# Patient Record
Sex: MALE | Race: WHITE | NOT HISPANIC OR LATINO | Employment: FULL TIME | URBAN - METROPOLITAN AREA
[De-identification: names, ages, dates, MRNs, and addresses within clinical notes are randomized per-mention and may not be internally consistent; named-entity substitution may affect disease eponyms.]

---

## 2017-02-11 ENCOUNTER — OFFICE VISIT (OUTPATIENT)
Dept: URGENT CARE | Facility: URGENT CARE | Age: 62
End: 2017-02-11
Payer: COMMERCIAL

## 2017-02-11 VITALS
DIASTOLIC BLOOD PRESSURE: 81 MMHG | HEIGHT: 72 IN | BODY MASS INDEX: 27.22 KG/M2 | HEART RATE: 73 BPM | SYSTOLIC BLOOD PRESSURE: 122 MMHG | WEIGHT: 201 LBS | TEMPERATURE: 98.1 F | OXYGEN SATURATION: 96 %

## 2017-02-11 DIAGNOSIS — J06.9 UPPER RESPIRATORY TRACT INFECTION, UNSPECIFIED TYPE: Primary | ICD-10-CM

## 2017-02-11 PROCEDURE — 99202 OFFICE O/P NEW SF 15 MIN: CPT | Performed by: NURSE PRACTITIONER

## 2017-02-11 NOTE — PATIENT INSTRUCTIONS

## 2017-02-11 NOTE — MR AVS SNAPSHOT
After Visit Summary   2/11/2017    Juan Carlos Helton    MRN: [id number]           Patient Information     Date Of Birth          1955        Visit Information        Provider Department      2/11/2017 11:00 AM Verónica Kaminski NP [facility]        Care Instructions      Viral Upper Respiratory Illness (Adult)  You have a viral upper respiratory illness (URI), which is another term for the common cold. This illness is contagious during the first few days. It is spread through the air by coughing and sneezing. It may also be spread by direct contact (touching the sick person and then touching your own eyes, nose, or mouth). Frequent handwashing will decrease risk of spread. Most viral illnesses go away within 7 to 10 days with rest and simple home remedies. Sometimes the illness may last for several weeks. Antibiotics will not kill a virus, and they are generally not prescribed for this condition.    Home care    If symptoms are severe, rest at home for the first 2 to 3 days. When you resume activity, don't let yourself get too tired.    Avoid being exposed to cigarette smoke (yours or others ).    You may use acetaminophen or ibuprofen to control pain and fever, unless another medicine was prescribed. (Note: If you have chronic liver or kidney disease, have ever had a stomach ulcer or gastrointestinal bleeding, or are taking blood-thinning medicines, talk with your healthcare provider before using these medicines.) Aspirin should never be given to anyone under 18 years of age who is ill with a viral infection or fever. It may cause severe liver or brain damage.    Your appetite may be poor, so a light diet is fine. Avoid dehydration by drinking 6 to 8 glasses of fluids per day (water, soft drinks, juices, tea, or soup). Extra fluids will help loosen secretions in the nose and lungs.    Over-the-counter cold medicines will not shorten the length of time you re sick, but they may  be helpful for the following symptoms: cough, sore throat, and nasal and sinus congestion. (Note: Do not use decongestants if you have high blood pressure.)  Follow-up care  Follow up with your healthcare provider, or as advised.  When to seek medical advice  Call your healthcare provider right away if any of these occur:    Cough with lots of colored sputum (mucus)    Severe headache; face, neck, or ear pain    Difficulty swallowing due to throat pain    Fever of 100.4 F (38 C)  Call 911, or get immediate medical care  Call emergency services right away if any of these occur:    Chest pain, shortness of breath, wheezing, or difficulty breathing    Coughing up blood    Inability to swallow due to throat pain    6750-1572 [employer]. [address] [place] [address], [place] [address]. All rights reserved. This information is not intended as a substitute for professional medical care. Always follow your healthcare professional's instructions.              Follow-ups after your visit        Who to contact     If you have questions or need follow up information about today's clinic visit or your schedule please contact [facility] directly at [phone].  Normal or non-critical lab and imaging results will be communicated to you by BladeLog[employer]art, letter or phone within 4 business days after the clinic has received the results. If you do not hear from us within 7 days, please contact the clinic through [employer] or phone. If you have a critical or abnormal lab result, we will notify you by phone as soon as possible.  Submit refill requests through Idhas[employer] or call your pharmacy and they will forward the refill request to us. Please allow 3 business days for your refill to be completed.          Additional Information About Your Visit        [employer] Information Technology Information     Id[employer] lets you send messages to your doctor, view your test results, renew your prescriptions, schedule appointments and more. To sign  "up, go to [email or web address] . Click on \"Log in\" on the left side of the screen, which will take you to the Welcome page. Then click on \"Sign up Now\" on the right side of the page.     You will be asked to enter the access code listed below, as well as some personal information. Please follow the directions to create your username and password.     Your access code is: [email or web address]  Expires: 2017 11:52 AM     Your access code will  in 90 days. If you need help or a new code, please call your [facility] clinic or [phone].        Care EveryWhere ID     This is your Care EveryWhere ID. This could be used by other organizations to access your [facility] medical records  [id number]        Your Vitals Were     Pulse Temperature Height BMI (Body Mass Index) Pulse Oximetry       73 98.1  F (36.7  C) (Tympanic) 6' (1.829 m) 27.25 kg/m2 96%        Blood Pressure from Last 3 Encounters:   17 122/81   13 108/80   12 108/82    Weight from Last 3 Encounters:   17 201 lb (91.173 kg)   13 200 lb (90.719 kg)   12 198 lb (89.812 kg)              Today, you had the following     No orders found for display       Primary Care Provider Office Phone # Fax #    Jose Trevizo [phone]       [facility] [id number] [place] [address]        Thank you!     Thank you for choosing [facility]  for your care. Our goal is always to provide you with excellent care. Hearing back from our patients is one way we can continue to improve our services. Please take a few minutes to complete the written survey that you may receive in the mail after your visit with us. Thank you!             Your Updated Medication List - Protect others around you: Learn how to safely use, store and throw away your medicines at www.disposemymeds.org.          This list is accurate as of: 17 11:53 AM.  Always use your most recent med list.                   " Brand Name Dispense Instructions for use    ADVIL PO      Take  by mouth as needed.       CELEBREX PO      Take  by mouth.       FISH OIL + D3 PO          LORATADINE PO      Take  by mouth.       MULTIVITAMIN ADULT PO          OMEPRAZOLE PO      Take  by mouth.       SIMVASTATIN PO      Take  by mouth.       VITAMIN C PO      Take  by mouth.

## 2017-02-11 NOTE — NURSING NOTE
Chief Complaint   Patient presents with     Urgent Care     Pt in clinic to have eval for sinus pressure, cough, and congestion for 3 days.     Sinus Problem     Respiratory Problems       Initial /81 mmHg  Pulse 73  Temp(Src) 98.1  F (36.7  C) (Tympanic)  Ht 6' (1.829 m)  Wt 201 lb (91.173 kg)  BMI 27.25 kg/m2  SpO2 96% Estimated body mass index is 27.25 kg/(m^2) as calculated from the following:    Height as of this encounter: 6' (1.829 m).    Weight as of this encounter: 201 lb (91.173 kg).  Medication Reconciliation: complete   Michaela Shrestha/ MA

## 2017-02-11 NOTE — PROGRESS NOTES
SUBJECTIVE:   Juan Carlos Helton is a 61 year old male presenting with a chief complaint of nasal congestion, rhinorrhea a lot and cough with some chills but no fever for the last 3 days   Patient complains of headache 5/10  Prior to this sick 3 weeks ago for 24 hours .  Course of illness is staying the same   Severity moderate severe  Current and Associated symptoms: nasal congestion, rhinorrhea and cough   Treatment measures tried include Day and Night, Sudafed and Advil  Predisposing factors include None    No past medical history on file.  Current Outpatient Prescriptions   Medication Sig Dispense Refill     Multiple Vitamins-Minerals (MULTIVITAMIN ADULT PO)        Ibuprofen (ADVIL PO) Take  by mouth as needed.       OMEPRAZOLE PO Take  by mouth.       Ascorbic Acid (VITAMIN C PO) Take  by mouth.       Fish Oil-Cholecalciferol (FISH OIL + D3 PO)        LORATADINE PO Take  by mouth.       Celecoxib (CELEBREX PO) Take  by mouth.       SIMVASTATIN PO Take  by mouth.       Social History   Substance Use Topics     Smoking status: Never Smoker      Smokeless tobacco: Never Used     Alcohol Use: Not on file     ROS:  INTEGUMENTARY/SKIN: NEGATIVE fo rashes,  GI: NEGATIVE for nausea, vomiting or diarrhea    OBJECTIVE  :/81 mmHg  Pulse 73  Temp(Src) 98.1  F (36.7  C) (Tympanic)  Ht 6' (1.829 m)  Wt 201 lb (91.173 kg)  BMI 27.25 kg/m2  SpO2 96%  GENERAL APPEARANCE: healthy, alert and no distress  EYES: EOMI,  PERRL, conjunctiva clear  HENT: ear canals and TM's normal.  Nares bilateral erythema without lesions  Oral pharynx erythema  NECK: supple, nontender, no lymphadenopathy  RESP: lungs clear to auscultation - no rales, rhonchi or wheezes  CV: regular rates and rhythm, normal S1 S2, no murmur noted  NEURO:   normal speech and mentation  SKIN: no suspicious lesions or rashes    ASSESSMENT: URI     PLAN:  Your symptoms appear to be viral at this time.  Nasal congestion often starts clear then turns yellow or green  towards the end- this is not a sign of a bacterial infection.  May use netti pot with bottled or distilled water and saline packets to flush sinuses.  Mucinex (guiafenesin) thins mucus and may help it to loosen more quickly  Sit in the bathroom with the door closed and hot shower running to loosen mucus.  See orders in Epic

## 2017-06-14 ENCOUNTER — RECORDS - HEALTHEAST (OUTPATIENT)
Dept: LAB | Facility: CLINIC | Age: 62
End: 2017-06-14

## 2017-06-14 LAB
CHOLEST SERPL-MCNC: 205 MG/DL
FASTING STATUS PATIENT QL REPORTED: NO
HDLC SERPL-MCNC: 42 MG/DL
LDLC SERPL CALC-MCNC: 130 MG/DL
PSA SERPL-MCNC: 0.6 NG/ML (ref 0–4.5)
TRIGL SERPL-MCNC: 164 MG/DL

## 2018-03-20 ENCOUNTER — RECORDS - HEALTHEAST (OUTPATIENT)
Dept: ADMINISTRATIVE | Facility: OTHER | Age: 63
End: 2018-03-20

## 2018-06-14 ENCOUNTER — RECORDS - HEALTHEAST (OUTPATIENT)
Dept: LAB | Facility: CLINIC | Age: 63
End: 2018-06-14

## 2018-06-14 ENCOUNTER — RECORDS - HEALTHEAST (OUTPATIENT)
Dept: ADMINISTRATIVE | Facility: OTHER | Age: 63
End: 2018-06-14

## 2018-06-14 LAB
ANION GAP SERPL CALCULATED.3IONS-SCNC: 11 MMOL/L (ref 5–18)
BUN SERPL-MCNC: 21 MG/DL (ref 8–22)
CALCIUM SERPL-MCNC: 9.9 MG/DL (ref 8.5–10.5)
CHLORIDE BLD-SCNC: 102 MMOL/L (ref 98–107)
CHOLEST SERPL-MCNC: 211 MG/DL
CO2 SERPL-SCNC: 23 MMOL/L (ref 22–31)
CREAT SERPL-MCNC: 0.83 MG/DL (ref 0.7–1.3)
FASTING STATUS PATIENT QL REPORTED: NO
GFR SERPL CREATININE-BSD FRML MDRD: >60 ML/MIN/1.73M2
GLUCOSE BLD-MCNC: 79 MG/DL (ref 70–125)
HDLC SERPL-MCNC: 43 MG/DL
LDLC SERPL CALC-MCNC: 126 MG/DL
POTASSIUM BLD-SCNC: 4.5 MMOL/L (ref 3.5–5)
SODIUM SERPL-SCNC: 136 MMOL/L (ref 136–145)
TRIGL SERPL-MCNC: 209 MG/DL

## 2019-06-17 ENCOUNTER — OFFICE VISIT - HEALTHEAST (OUTPATIENT)
Dept: FAMILY MEDICINE | Facility: CLINIC | Age: 64
End: 2019-06-17

## 2019-06-17 DIAGNOSIS — Z13.228 SCREENING FOR ENDOCRINE, NUTRITIONAL, METABOLIC AND IMMUNITY DISORDER: ICD-10-CM

## 2019-06-17 DIAGNOSIS — Z13.0 SCREENING FOR ENDOCRINE, NUTRITIONAL, METABOLIC AND IMMUNITY DISORDER: ICD-10-CM

## 2019-06-17 DIAGNOSIS — Z13.29 SCREENING FOR ENDOCRINE, NUTRITIONAL, METABOLIC AND IMMUNITY DISORDER: ICD-10-CM

## 2019-06-17 DIAGNOSIS — K21.9 GASTROESOPHAGEAL REFLUX DISEASE WITHOUT ESOPHAGITIS: ICD-10-CM

## 2019-06-17 DIAGNOSIS — Z13.21 SCREENING FOR ENDOCRINE, NUTRITIONAL, METABOLIC AND IMMUNITY DISORDER: ICD-10-CM

## 2019-06-17 DIAGNOSIS — Z00.00 PREVENTATIVE HEALTH CARE: ICD-10-CM

## 2019-06-17 LAB
ALBUMIN SERPL-MCNC: 4 G/DL (ref 3.5–5)
ALP SERPL-CCNC: 55 U/L (ref 45–120)
ALT SERPL W P-5'-P-CCNC: 21 U/L (ref 0–45)
ANION GAP SERPL CALCULATED.3IONS-SCNC: 9 MMOL/L (ref 5–18)
AST SERPL W P-5'-P-CCNC: 28 U/L (ref 0–40)
BASOPHILS # BLD AUTO: 0 THOU/UL (ref 0–0.2)
BASOPHILS NFR BLD AUTO: 1 % (ref 0–2)
BILIRUB SERPL-MCNC: 0.7 MG/DL (ref 0–1)
BUN SERPL-MCNC: 12 MG/DL (ref 8–22)
CALCIUM SERPL-MCNC: 9.9 MG/DL (ref 8.5–10.5)
CHLORIDE BLD-SCNC: 104 MMOL/L (ref 98–107)
CHOLEST SERPL-MCNC: 234 MG/DL
CO2 SERPL-SCNC: 25 MMOL/L (ref 22–31)
CREAT SERPL-MCNC: 0.81 MG/DL (ref 0.7–1.3)
EOSINOPHIL # BLD AUTO: 0.1 THOU/UL (ref 0–0.4)
EOSINOPHIL NFR BLD AUTO: 2 % (ref 0–6)
ERYTHROCYTE [DISTWIDTH] IN BLOOD BY AUTOMATED COUNT: 12.1 % (ref 11–14.5)
FASTING STATUS PATIENT QL REPORTED: YES
GFR SERPL CREATININE-BSD FRML MDRD: >60 ML/MIN/1.73M2
GLUCOSE BLD-MCNC: 91 MG/DL (ref 70–125)
HBA1C MFR BLD: 5.4 % (ref 3.5–6)
HCT VFR BLD AUTO: 44 % (ref 40–54)
HDLC SERPL-MCNC: 49 MG/DL
HGB BLD-MCNC: 15.2 G/DL (ref 14–18)
LDLC SERPL CALC-MCNC: 148 MG/DL
LYMPHOCYTES # BLD AUTO: 1.1 THOU/UL (ref 0.8–4.4)
LYMPHOCYTES NFR BLD AUTO: 21 % (ref 20–40)
MCH RBC QN AUTO: 31.8 PG (ref 27–34)
MCHC RBC AUTO-ENTMCNC: 34.5 G/DL (ref 32–36)
MCV RBC AUTO: 92 FL (ref 80–100)
MONOCYTES # BLD AUTO: 0.4 THOU/UL (ref 0–0.9)
MONOCYTES NFR BLD AUTO: 8 % (ref 2–10)
NEUTROPHILS # BLD AUTO: 3.6 THOU/UL (ref 2–7.7)
NEUTROPHILS NFR BLD AUTO: 68 % (ref 50–70)
PLATELET # BLD AUTO: 207 THOU/UL (ref 140–440)
PMV BLD AUTO: 7.4 FL (ref 7–10)
POTASSIUM BLD-SCNC: 4.4 MMOL/L (ref 3.5–5)
PROT SERPL-MCNC: 7.3 G/DL (ref 6–8)
PSA SERPL-MCNC: 1.1 NG/ML (ref 0–4.5)
RBC # BLD AUTO: 4.77 MILL/UL (ref 4.4–6.2)
SODIUM SERPL-SCNC: 138 MMOL/L (ref 136–145)
TRIGL SERPL-MCNC: 187 MG/DL
WBC: 5.3 THOU/UL (ref 4–11)

## 2019-06-17 ASSESSMENT — MIFFLIN-ST. JEOR: SCORE: 1724.99

## 2019-07-19 ENCOUNTER — COMMUNICATION - HEALTHEAST (OUTPATIENT)
Dept: FAMILY MEDICINE | Facility: CLINIC | Age: 64
End: 2019-07-19

## 2019-07-19 DIAGNOSIS — K21.9 GASTROESOPHAGEAL REFLUX DISEASE WITHOUT ESOPHAGITIS: ICD-10-CM

## 2019-08-26 ENCOUNTER — COMMUNICATION - HEALTHEAST (OUTPATIENT)
Dept: FAMILY MEDICINE | Facility: CLINIC | Age: 64
End: 2019-08-26

## 2019-08-26 DIAGNOSIS — K21.9 GASTROESOPHAGEAL REFLUX DISEASE WITHOUT ESOPHAGITIS: ICD-10-CM

## 2019-10-07 ENCOUNTER — OFFICE VISIT - HEALTHEAST (OUTPATIENT)
Dept: FAMILY MEDICINE | Facility: CLINIC | Age: 64
End: 2019-10-07

## 2019-10-07 DIAGNOSIS — B07.0 PLANTAR WARTS: ICD-10-CM

## 2019-12-09 ENCOUNTER — OFFICE VISIT - HEALTHEAST (OUTPATIENT)
Dept: FAMILY MEDICINE | Facility: CLINIC | Age: 64
End: 2019-12-09

## 2019-12-09 DIAGNOSIS — B07.0 PLANTAR WART OF LEFT FOOT: ICD-10-CM

## 2020-02-05 ENCOUNTER — OFFICE VISIT - HEALTHEAST (OUTPATIENT)
Dept: PODIATRY | Facility: CLINIC | Age: 65
End: 2020-02-05

## 2020-02-05 DIAGNOSIS — Q66.72 CAVUS DEFORMITY OF LEFT FOOT: ICD-10-CM

## 2020-02-05 DIAGNOSIS — M21.6X2 PLANTAR FLEXED METATARSAL BONE OF LEFT FOOT: ICD-10-CM

## 2020-02-05 DIAGNOSIS — B07.0 VERRUCA PLANTARIS: ICD-10-CM

## 2020-02-10 ENCOUNTER — RECORDS - HEALTHEAST (OUTPATIENT)
Dept: ADMINISTRATIVE | Facility: OTHER | Age: 65
End: 2020-02-10

## 2020-02-10 ENCOUNTER — OFFICE VISIT - HEALTHEAST (OUTPATIENT)
Dept: INTERNAL MEDICINE | Facility: CLINIC | Age: 65
End: 2020-02-10

## 2020-02-10 DIAGNOSIS — R20.2 NUMBNESS AND TINGLING IN LEFT HAND: ICD-10-CM

## 2020-02-10 DIAGNOSIS — M54.2 NECK PAIN: ICD-10-CM

## 2020-02-10 DIAGNOSIS — M67.432 GANGLION CYST OF WRIST, LEFT: ICD-10-CM

## 2020-02-10 DIAGNOSIS — G56.02 CARPAL TUNNEL SYNDROME OF LEFT WRIST: ICD-10-CM

## 2020-02-10 DIAGNOSIS — R20.0 NUMBNESS AND TINGLING IN LEFT HAND: ICD-10-CM

## 2020-02-20 ENCOUNTER — COMMUNICATION - HEALTHEAST (OUTPATIENT)
Dept: OTHER | Facility: CLINIC | Age: 65
End: 2020-02-20

## 2020-02-26 ENCOUNTER — RECORDS - HEALTHEAST (OUTPATIENT)
Dept: ADMINISTRATIVE | Facility: OTHER | Age: 65
End: 2020-02-26

## 2020-03-04 ENCOUNTER — AMBULATORY - HEALTHEAST (OUTPATIENT)
Dept: OTHER | Facility: CLINIC | Age: 65
End: 2020-03-04

## 2020-03-19 ENCOUNTER — AMBULATORY - HEALTHEAST (OUTPATIENT)
Dept: OTHER | Facility: CLINIC | Age: 65
End: 2020-03-19

## 2020-07-23 ENCOUNTER — COMMUNICATION - HEALTHEAST (OUTPATIENT)
Dept: FAMILY MEDICINE | Facility: CLINIC | Age: 65
End: 2020-07-23

## 2020-07-23 DIAGNOSIS — K21.9 GASTROESOPHAGEAL REFLUX DISEASE WITHOUT ESOPHAGITIS: ICD-10-CM

## 2020-07-28 ENCOUNTER — OFFICE VISIT - HEALTHEAST (OUTPATIENT)
Dept: PODIATRY | Facility: CLINIC | Age: 65
End: 2020-07-28

## 2020-07-28 DIAGNOSIS — M21.6X2 PLANTAR FLEXED METATARSAL BONE OF LEFT FOOT: ICD-10-CM

## 2020-08-28 ENCOUNTER — COMMUNICATION - HEALTHEAST (OUTPATIENT)
Dept: FAMILY MEDICINE | Facility: CLINIC | Age: 65
End: 2020-08-28

## 2020-09-08 ENCOUNTER — OFFICE VISIT - HEALTHEAST (OUTPATIENT)
Dept: FAMILY MEDICINE | Facility: CLINIC | Age: 65
End: 2020-09-08
Payer: COMMERCIAL

## 2020-09-08 DIAGNOSIS — Z13.21 SCREENING FOR ENDOCRINE, NUTRITIONAL, METABOLIC AND IMMUNITY DISORDER: ICD-10-CM

## 2020-09-08 DIAGNOSIS — Z12.5 SCREENING FOR PROSTATE CANCER: ICD-10-CM

## 2020-09-08 DIAGNOSIS — Z13.0 SCREENING FOR ENDOCRINE, NUTRITIONAL, METABOLIC AND IMMUNITY DISORDER: ICD-10-CM

## 2020-09-08 DIAGNOSIS — Z00.00 VISIT FOR PREVENTIVE HEALTH EXAMINATION: ICD-10-CM

## 2020-09-08 DIAGNOSIS — Z11.59 ENCOUNTER FOR HCV SCREENING TEST FOR LOW RISK PATIENT: ICD-10-CM

## 2020-09-08 DIAGNOSIS — Z13.228 SCREENING FOR ENDOCRINE, NUTRITIONAL, METABOLIC AND IMMUNITY DISORDER: ICD-10-CM

## 2020-09-08 DIAGNOSIS — Z13.29 SCREENING FOR ENDOCRINE, NUTRITIONAL, METABOLIC AND IMMUNITY DISORDER: ICD-10-CM

## 2020-09-08 DIAGNOSIS — Z11.4 SCREENING FOR HIV WITHOUT PRESENCE OF RISK FACTORS: ICD-10-CM

## 2020-09-08 DIAGNOSIS — K21.9 GASTROESOPHAGEAL REFLUX DISEASE WITHOUT ESOPHAGITIS: ICD-10-CM

## 2020-09-08 DIAGNOSIS — E78.00 HIGH CHOLESTEROL: ICD-10-CM

## 2020-09-08 LAB
ALBUMIN SERPL-MCNC: 4.1 G/DL (ref 3.5–5)
ALP SERPL-CCNC: 58 U/L (ref 45–120)
ALT SERPL W P-5'-P-CCNC: 22 U/L (ref 0–45)
ANION GAP SERPL CALCULATED.3IONS-SCNC: 9 MMOL/L (ref 5–18)
AST SERPL W P-5'-P-CCNC: 31 U/L (ref 0–40)
BILIRUB SERPL-MCNC: 0.6 MG/DL (ref 0–1)
BUN SERPL-MCNC: 15 MG/DL (ref 8–22)
CALCIUM SERPL-MCNC: 9.5 MG/DL (ref 8.5–10.5)
CHLORIDE BLD-SCNC: 102 MMOL/L (ref 98–107)
CHOLEST SERPL-MCNC: 254 MG/DL
CO2 SERPL-SCNC: 25 MMOL/L (ref 22–31)
CREAT SERPL-MCNC: 0.83 MG/DL (ref 0.7–1.3)
CRP SERPL HS-MCNC: 1.4 MG/L (ref 0–3)
FASTING STATUS PATIENT QL REPORTED: YES
GFR SERPL CREATININE-BSD FRML MDRD: >60 ML/MIN/1.73M2
GLUCOSE BLD-MCNC: 90 MG/DL (ref 70–125)
HBA1C MFR BLD: 5.5 %
HCV AB SERPL QL IA: NEGATIVE
HDLC SERPL-MCNC: 54 MG/DL
HIV 1+2 AB+HIV1 P24 AG SERPL QL IA: NEGATIVE
LDLC SERPL CALC-MCNC: 174 MG/DL
POTASSIUM BLD-SCNC: 4.4 MMOL/L (ref 3.5–5)
PROT SERPL-MCNC: 7.5 G/DL (ref 6–8)
PSA SERPL-MCNC: 1 NG/ML (ref 0–4.5)
SODIUM SERPL-SCNC: 136 MMOL/L (ref 136–145)
TRIGL SERPL-MCNC: 129 MG/DL
TSH SERPL DL<=0.005 MIU/L-ACNC: 1.92 UIU/ML (ref 0.3–5)

## 2020-09-08 ASSESSMENT — MIFFLIN-ST. JEOR: SCORE: 1732.93

## 2020-09-08 NOTE — LETTER
September 16, 2022      Juan Carlos Helton  [address]   [place] [address]        Dear ,    We are writing to inform you of your test results.    Juan Carlos,  Cholesterol is persistently elevated, genetic marker of high cholesterol was also high,ok to  continue healthy lifestyle changes but I  think any medication can help lower your risk of cardiovascular disease further.  Let me know and I will send a prescription to your pharmacy.  Dr. Chamberlain    The 10-year ASCVD risk score (Randolphkristine DELACRUZ Jr., et al., 2013) is: 12.4%    Values used to calculate the score:      Age: 67 years      Sex: Male      Is Non- : No      Diabetic: No      Tobacco smoker: No      Systolic Blood Pressure: 132 mmHg      Is BP treated: No      HDL Cholesterol: 63 mg/dL      Total Cholesterol: 216 mg/dL    Resulted Orders   Lipoprotein (a)   Result Value Ref Range    Lipoprotein (a) 89 (H) <30 mg/dL       If you have any questions or concerns, please call the clinic at the number listed above.       Sincerely,      Juarez Chamberlain MD

## 2020-09-11 ENCOUNTER — COMMUNICATION - HEALTHEAST (OUTPATIENT)
Dept: FAMILY MEDICINE | Facility: CLINIC | Age: 65
End: 2020-09-11

## 2020-10-14 ENCOUNTER — RECORDS - HEALTHEAST (OUTPATIENT)
Dept: ADMINISTRATIVE | Facility: OTHER | Age: 65
End: 2020-10-14

## 2020-10-21 ENCOUNTER — RECORDS - HEALTHEAST (OUTPATIENT)
Dept: ADMINISTRATIVE | Facility: OTHER | Age: 65
End: 2020-10-21

## 2020-12-17 ENCOUNTER — COMMUNICATION - HEALTHEAST (OUTPATIENT)
Dept: FAMILY MEDICINE | Facility: CLINIC | Age: 65
End: 2020-12-17

## 2020-12-17 DIAGNOSIS — K21.9 GASTROESOPHAGEAL REFLUX DISEASE WITHOUT ESOPHAGITIS: ICD-10-CM

## 2021-01-21 ENCOUNTER — OFFICE VISIT - HEALTHEAST (OUTPATIENT)
Dept: PODIATRY | Facility: CLINIC | Age: 66
End: 2021-01-21

## 2021-01-21 DIAGNOSIS — M20.42 HAMMER TOE OF LEFT FOOT: ICD-10-CM

## 2021-01-21 DIAGNOSIS — L57.0 KERATOMA: ICD-10-CM

## 2021-01-21 DIAGNOSIS — M21.6X2 PLANTAR FLEXED METATARSAL BONE OF LEFT FOOT: ICD-10-CM

## 2021-01-21 ASSESSMENT — MIFFLIN-ST. JEOR: SCORE: 1732.93

## 2021-02-26 ENCOUNTER — COMMUNICATION - HEALTHEAST (OUTPATIENT)
Dept: FAMILY MEDICINE | Facility: CLINIC | Age: 66
End: 2021-02-26

## 2021-02-26 ENCOUNTER — AMBULATORY - HEALTHEAST (OUTPATIENT)
Dept: FAMILY MEDICINE | Facility: CLINIC | Age: 66
End: 2021-02-26

## 2021-02-26 DIAGNOSIS — M19.90 ARTHRITIS: ICD-10-CM

## 2021-03-02 ENCOUNTER — COMMUNICATION - HEALTHEAST (OUTPATIENT)
Dept: FAMILY MEDICINE | Facility: CLINIC | Age: 66
End: 2021-03-02

## 2021-03-08 ENCOUNTER — COMMUNICATION - HEALTHEAST (OUTPATIENT)
Dept: PODIATRY | Facility: CLINIC | Age: 66
End: 2021-03-08

## 2021-03-09 ENCOUNTER — AMBULATORY - HEALTHEAST (OUTPATIENT)
Dept: PODIATRY | Facility: CLINIC | Age: 66
End: 2021-03-09

## 2021-03-09 DIAGNOSIS — M21.6X2 PLANTAR FLEXED METATARSAL BONE OF LEFT FOOT: ICD-10-CM

## 2021-03-23 ENCOUNTER — COMMUNICATION - HEALTHEAST (OUTPATIENT)
Dept: FAMILY MEDICINE | Facility: CLINIC | Age: 66
End: 2021-03-23

## 2021-03-23 DIAGNOSIS — M19.90 ARTHRITIS: ICD-10-CM

## 2021-03-26 ENCOUNTER — SURGERY - HEALTHEAST (OUTPATIENT)
Dept: PODIATRY | Facility: CLINIC | Age: 66
End: 2021-03-26

## 2021-03-26 ENCOUNTER — COMMUNICATION - HEALTHEAST (OUTPATIENT)
Dept: PODIATRY | Facility: CLINIC | Age: 66
End: 2021-03-26

## 2021-03-26 ENCOUNTER — AMBULATORY - HEALTHEAST (OUTPATIENT)
Dept: PODIATRY | Facility: CLINIC | Age: 66
End: 2021-03-26

## 2021-03-26 ENCOUNTER — SURGERY - HEALTHEAST (OUTPATIENT)
Dept: SURGERY | Facility: AMBULATORY SURGERY CENTER | Age: 66
End: 2021-03-26
Payer: COMMERCIAL

## 2021-03-26 DIAGNOSIS — M62.40 CONTRACTED, TENDON: ICD-10-CM

## 2021-03-26 DIAGNOSIS — M21.6X2 PLANTAR FLEXED METATARSAL BONE OF LEFT FOOT: ICD-10-CM

## 2021-03-26 DIAGNOSIS — M20.42 HAMMER TOE OF SECOND TOE OF LEFT FOOT: ICD-10-CM

## 2021-03-29 ENCOUNTER — AMBULATORY - HEALTHEAST (OUTPATIENT)
Dept: PODIATRY | Facility: CLINIC | Age: 66
End: 2021-03-29

## 2021-03-29 ENCOUNTER — AMBULATORY - HEALTHEAST (OUTPATIENT)
Dept: NURSING | Facility: CLINIC | Age: 66
End: 2021-03-29

## 2021-03-31 ENCOUNTER — SURGERY - HEALTHEAST (OUTPATIENT)
Dept: PODIATRY | Facility: CLINIC | Age: 66
End: 2021-03-31

## 2021-03-31 DIAGNOSIS — M62.40 CONTRACTED, TENDON: ICD-10-CM

## 2021-03-31 DIAGNOSIS — M21.6X2 PLANTAR FLEXED METATARSAL BONE OF LEFT FOOT: ICD-10-CM

## 2021-03-31 DIAGNOSIS — M20.42 HAMMERTOE OF LEFT FOOT: ICD-10-CM

## 2021-04-01 ENCOUNTER — AMBULATORY - HEALTHEAST (OUTPATIENT)
Dept: SURGERY | Facility: AMBULATORY SURGERY CENTER | Age: 66
End: 2021-04-01

## 2021-04-01 DIAGNOSIS — Z11.59 ENCOUNTER FOR SCREENING FOR OTHER VIRAL DISEASES: ICD-10-CM

## 2021-04-19 ENCOUNTER — AMBULATORY - HEALTHEAST (OUTPATIENT)
Dept: NURSING | Facility: CLINIC | Age: 66
End: 2021-04-19

## 2021-04-26 ENCOUNTER — OFFICE VISIT - HEALTHEAST (OUTPATIENT)
Dept: FAMILY MEDICINE | Facility: CLINIC | Age: 66
End: 2021-04-26

## 2021-04-26 DIAGNOSIS — M20.42 HAMMERTOE OF LEFT FOOT: ICD-10-CM

## 2021-04-26 DIAGNOSIS — K21.9 GASTROESOPHAGEAL REFLUX DISEASE WITHOUT ESOPHAGITIS: ICD-10-CM

## 2021-04-26 DIAGNOSIS — Z01.818 PREOP GENERAL PHYSICAL EXAM: ICD-10-CM

## 2021-04-26 LAB — HGB BLD-MCNC: 14.5 G/DL (ref 14–18)

## 2021-04-27 ENCOUNTER — AMBULATORY - HEALTHEAST (OUTPATIENT)
Dept: LAB | Facility: CLINIC | Age: 66
End: 2021-04-27

## 2021-04-27 ENCOUNTER — RECORDS - HEALTHEAST (OUTPATIENT)
Dept: ADMINISTRATIVE | Facility: OTHER | Age: 66
End: 2021-04-27

## 2021-04-27 DIAGNOSIS — Z11.59 ENCOUNTER FOR SCREENING FOR OTHER VIRAL DISEASES: ICD-10-CM

## 2021-04-27 ASSESSMENT — MIFFLIN-ST. JEOR: SCORE: 1627.46

## 2021-04-28 LAB
SARS-COV-2 PCR COMMENT: NORMAL
SARS-COV-2 RNA SPEC QL NAA+PROBE: NEGATIVE
SARS-COV-2 VIRUS SPECIMEN SOURCE: NORMAL

## 2021-04-29 ENCOUNTER — ANESTHESIA - HEALTHEAST (OUTPATIENT)
Dept: SURGERY | Facility: AMBULATORY SURGERY CENTER | Age: 66
End: 2021-04-29

## 2021-04-29 ENCOUNTER — COMMUNICATION - HEALTHEAST (OUTPATIENT)
Dept: SCHEDULING | Facility: CLINIC | Age: 66
End: 2021-04-29

## 2021-04-30 ENCOUNTER — RECORDS - HEALTHEAST (OUTPATIENT)
Dept: ADMINISTRATIVE | Facility: OTHER | Age: 66
End: 2021-04-30

## 2021-04-30 ENCOUNTER — COMMUNICATION - HEALTHEAST (OUTPATIENT)
Dept: PODIATRY | Facility: CLINIC | Age: 66
End: 2021-04-30

## 2021-04-30 ENCOUNTER — SURGERY - HEALTHEAST (OUTPATIENT)
Dept: SURGERY | Facility: AMBULATORY SURGERY CENTER | Age: 66
End: 2021-04-30
Payer: COMMERCIAL

## 2021-04-30 ASSESSMENT — MIFFLIN-ST. JEOR
SCORE: 1627.46
SCORE: 1627.46

## 2021-05-05 ENCOUNTER — COMMUNICATION - HEALTHEAST (OUTPATIENT)
Dept: PODIATRY | Facility: CLINIC | Age: 66
End: 2021-05-05

## 2021-05-10 ENCOUNTER — OFFICE VISIT - HEALTHEAST (OUTPATIENT)
Dept: PODIATRY | Facility: CLINIC | Age: 66
End: 2021-05-10

## 2021-05-10 DIAGNOSIS — M62.40 CONTRACTED, TENDON: ICD-10-CM

## 2021-05-10 DIAGNOSIS — M20.42 HAMMERTOE OF LEFT FOOT: ICD-10-CM

## 2021-05-10 DIAGNOSIS — M21.6X2 PLANTAR FLEXED METATARSAL BONE OF LEFT FOOT: ICD-10-CM

## 2021-05-10 ASSESSMENT — MIFFLIN-ST. JEOR: SCORE: 1630.87

## 2021-05-18 ENCOUNTER — OFFICE VISIT - HEALTHEAST (OUTPATIENT)
Dept: PODIATRY | Facility: CLINIC | Age: 66
End: 2021-05-18

## 2021-05-18 DIAGNOSIS — M20.42 HAMMERTOE OF LEFT FOOT: ICD-10-CM

## 2021-05-18 ASSESSMENT — MIFFLIN-ST. JEOR: SCORE: 1635.4

## 2021-05-19 ENCOUNTER — COMMUNICATION - HEALTHEAST (OUTPATIENT)
Dept: PODIATRY | Facility: CLINIC | Age: 66
End: 2021-05-19

## 2021-05-26 ENCOUNTER — RECORDS - HEALTHEAST (OUTPATIENT)
Dept: GENERAL RADIOLOGY | Facility: CLINIC | Age: 66
End: 2021-05-26

## 2021-05-26 ENCOUNTER — OFFICE VISIT - HEALTHEAST (OUTPATIENT)
Dept: PODIATRY | Facility: CLINIC | Age: 66
End: 2021-05-26

## 2021-05-26 DIAGNOSIS — M20.42 HAMMERTOE OF LEFT FOOT: ICD-10-CM

## 2021-05-26 DIAGNOSIS — M20.42 OTHER HAMMER TOE(S) (ACQUIRED), LEFT FOOT: ICD-10-CM

## 2021-05-26 ASSESSMENT — MIFFLIN-ST. JEOR: SCORE: 1635.4

## 2021-05-29 NOTE — PROGRESS NOTES
Assessment:      Healthy male exam.        Plan:     1.  GERD, currently on omeprazole, healthy lifestyle changes discussed. Arthritis, discussed about taking NSAID as needed instead of daily.  2. Patient Counseling:  --Nutrition: Stressed importance of moderation in sodium/caffeine intake, saturated fat and cholesterol, caloric balance, sufficient intake of fresh fruits, vegetables, fiber, calcium, iron.  --Discussed the issue of calcium supplement, and the daily use of baby aspirin.  --Exercise: Stressed the importance of regular exercise.   --Substance Abuse: Discussed cessation/primary prevention of tobacco, alcohol, or other drug use; driving or other dangerous activities under the influence; availability of treatment for abuse.    --Sexuality: Discussed sexually transmitted diseases, partner selection, use of condoms, avoidance of unintended pregnancy.  --Injury prevention: Discussed safety belts, safety helmets, smoke detector, smoking near bedding or upholstery.   --Dental health: Discussed importance of regular tooth brushing, flossing, and dental visits.  --Immunizations reviewed.  --Discussed timing and benefits of screening colonoscopy and alternative options.  --After hours service discussed with patient             -- I have had an Advance Directives discussion with the patient.  The following high BMI interventions were performed this visit: encouragement to exercise and weight loss from baseline weight    3. Discussed the patient's BMI with him.  The BMI is above average; BMI management plan is completed  4. Follow up as needed for acute illness     Subjective:      Juan Carlos Helton is a 64 y.o. male who presents for an annual exam. The patient reports that there is not domestic violence in his life.   The patient, previously seen at entire clinic, history of GERD, taking omeprazole 20 mg daily, also take diclofenac daily for arthritis prevention and treatment.  Past medical surgery included  appendectomy at age 8 or 9, right inguinal hernia repair, ankle fusion for osteoarthritis.  Mom  of complication of stomach cancer.  Screening colonoscopy was done last year, adenomatous polyps were removed, due for follow-up 5 years  Healthy Habits:   Regular Exercise: Yes  Sunscreen Use: Yes  Healthy Diet: Yes  Dental Visits Regularly: Yes  Seat Belt: Yes  Sexually active: Yes  Monthly Self Testicular Exams:  Yes  Hemoccults: N/A  Flex Sig: N/A  Colonoscopy: Yes  Lipid Profile: Yes  Glucose Screen: Yes  Prevention of Osteoporosis: Yes  Last Dexa: N/A  Guns at Home:  N/A        There is no immunization history on file for this patient.  Immunization status: stated as current, but no records available.  Vision Screening:both eyes  Hearing: PASS     Current Outpatient Medications   Medication Sig Dispense Refill     ASCORBIC ACID, VITAMIN C, ORAL Take by mouth.       cholecalciferol, vitamin D3, (VITAMIN D3) 2,000 unit capsule        DICLOFENAC SODIUM ORAL Take by mouth.       green tea leaf extract (GREEN TEA) cap daily       ibuprofen (ADVIL) 200 MG tablet Take by mouth.       loratadine (CLARITIN) 10 mg tablet Take by mouth.       MULTIVITAMIN ORAL Take by mouth.       omega-3 fatty acids (FISH OIL CONCENTRATE ORAL) Take by mouth.       omeprazole (PRILOSEC) 20 MG capsule TAKE 1 CAP BY MOUTH ONCE DAILY.  0     No current facility-administered medications for this visit.      No past medical history on file.  No past surgical history on file.  Patient has no known allergies.  No family history on file.  Social History     Socioeconomic History     Marital status:      Spouse name: Not on file     Number of children: Not on file     Years of education: Not on file     Highest education level: Not on file   Occupational History     Not on file   Social Needs     Financial resource strain: Not on file     Food insecurity:     Worry: Not on file     Inability: Not on file     Transportation needs:      Medical: Not on file     Non-medical: Not on file   Tobacco Use     Smoking status: Not on file   Substance and Sexual Activity     Alcohol use: Not on file     Drug use: Not on file     Sexual activity: Not on file   Lifestyle     Physical activity:     Days per week: Not on file     Minutes per session: Not on file     Stress: Not on file   Relationships     Social connections:     Talks on phone: Not on file     Gets together: Not on file     Attends [employer] service: Not on file     Active member of club or organization: Not on file     Attends meetings of clubs or organizations: Not on file     Relationship status: Not on file     Intimate partner violence:     Fear of current or ex partner: Not on file     Emotionally abused: Not on file     Physically abused: Not on file     Forced sexual activity: Not on file   Other Topics Concern     Not on file   Social History Narrative     Not on file     No specialty comments available.  Review of Systems  General:  Denies problem  Eyes: Denies problem  Ears/Nose/Throat: Denies problem  Cardiovascular: Denies problem  Respiratory:  Denies problem  Gastrointestinal:  Denies problem  Genitourinary: Denies problem  Musculoskeletal:  Denies problem  Skin: Denies problem  Neurologic: Denies problem  Psychiatric: Denies problem  Endocrine: Denies problem  Heme/Lymphatic: Denies problem   Allergic/Immunologic: Denies problem        Objective:     There were no vitals filed for this visit.  There is no height or weight on file to calculate BMI.    Physical  General Appearance: Alert, cooperative, no distress, appears stated age  Head: Normocephalic, without obvious abnormality, atraumatic  Eyes: PERRL, conjunctiva/corneas clear, EOM's intact  Ears: Normal TM's and external ear canals, both ears  Nose: Nares normal, septum midline,mucosa normal, no drainage  Throat: Lips, mucosa, and tongue normal; teeth and gums normal  Neck: Supple, symmetrical, trachea midline, no  adenopathy;  thyroid: not enlarged, symmetric, no tenderness/mass/nodules; no carotid bruit or JVD  Back: Symmetric, no curvature, ROM normal, no CVA tenderness  Lungs: Clear to auscultation bilaterally, respirations unlabored  Heart: Regular rate and rhythm, S1 and S2 normal, no murmur, rub, or gallop,  Abdomen: Soft, non-tender, bowel sounds active all four quadrants,  no masses, no organomegaly  Genitourinary: Penis normal. Right and left testis are descended.No palpable masses.   Rectal: No visible external lesion  Musculoskeletal: Normal range of motion. No joint swelling or deformity.   Extremities: Extremities normal, atraumatic, no cyanosis or edema  Skin: Skin color, texture, turgor normal, no rashes or lesions  Lymph nodes: Cervical, supraclavicular, and axillary nodes normal  Neurologic: He is alert. He has normal reflexes.   Psychiatric: He has a normal mood and affect.        Juarez Chamberlain MD        There are no diagnoses linked to this encounter.

## 2021-05-30 NOTE — TELEPHONE ENCOUNTER
Medication Request  Medication name:   omeprazole (PRILOSEC) 20 MG capsule  0 5/2/2019     Sig: TAKE 1 CAP BY MOUTH ONCE DAILY.    Class: Historical Med        Pharmacy Name and Location: [facility] [address] [address]  Reason for request: acid reflux  When did you use medication last?:  today  Patient offered appointment:  patient declined Patient was just seen last month and at that time he thought he did not need refills but now is requesting this to be refilled for 90 days.  Okay to leave a detailed message: yes

## 2021-05-31 NOTE — TELEPHONE ENCOUNTER
Refill Approved    Rx renewed per Medication Renewal Policy. Medication was last renewed on 7/19/19.    Sharon Iglesias, [facility] Connection Triage/Med Refill 8/26/2019     Requested Prescriptions   Pending Prescriptions Disp Refills     omeprazole (PRILOSEC) 20 MG capsule 90 capsule 3     Sig: TAKE 1 CAP BY MOUTH ONCE DAILY.       GI Medications Refill Protocol Passed - 8/26/2019  9:12 AM        Passed - PCP or prescribing provider visit in last 12 or next 3 months.     Last office visit with prescriber/PCP: Visit date not found OR same dept: Visit date not found OR same specialty: Visit date not found  Last physical: 6/17/2019 Last MTM visit: Visit date not found   Next visit within 3 mo: Visit date not found  Next physical within 3 mo: Visit date not found  Prescriber OR PCP: Juarez Chamberlain MD  Last diagnosis associated with med order: 1. Gastroesophageal reflux disease without esophagitis  - omeprazole (PRILOSEC) 20 MG capsule; TAKE 1 CAP BY MOUTH ONCE DAILY.  Dispense: 90 capsule; Refill: 3    If protocol passes may refill for 12 months if within 3 months of last provider visit (or a total of 15 months).

## 2021-05-31 NOTE — TELEPHONE ENCOUNTER
Refill Request  Did you contact pharmacy: Yes  Medication name:   Requested Prescriptions     Pending Prescriptions Disp Refills     omeprazole (PRILOSEC) 20 MG capsule 90 capsule 3     Sig: TAKE 1 CAP BY MOUTH ONCE DAILY.     Who prescribed the medication: Juarez Chamberlain MD    Pharmacy Name and Location: [facility] [address] [address]  Is patient out of medication: Yes  Patient notified refills processed in 72 hours:  yes  Okay to leave a detailed message: yes- [phone]

## 2021-06-02 NOTE — PROGRESS NOTES
OFFICE VISIT - FAMILY MEDICINE     ASSESSMENT AND PLAN     1. Plantar warts     Plantar warts, area was pared and treated with liquid nitrogen, patient tolerated well, follow-up in about 3 to 4 weeks, sooner if there is any new concern.    CHIEF COMPLAINT   Foot Pain (lt. bottom of foot-callous; painful when walking. Started about 1 month ago.) and Immunizations (pneumonia)    HPI   Juan Carlos Helton is a 64 y.o. male.  No Patient Care Coordination Note on file.    Left plantar forefoot calluses, has been present for several months, becoming painful, especially with walking.  No injury, no stepping to foreign body.  Has had callus excision in the past.  Would like to check about pneumonia vaccine and we discussed the indications include age 65 and above.      Review of Systems As per HPI, otherwise negative.    OBJECTIVE   /80 (Patient Site: Right Arm, Patient Position: Sitting, Cuff Size: Adult Regular)   Pulse 65   Wt 199 lb 4 oz (90.4 kg)   SpO2 96%   BMI 27.79 kg/m    Physical Exam   Constitutional: He appears well-developed and well-nourished.   Skin:   Left plantar forefoot 1 to 2 mm warts  x2, area was pared with a # 10 blade, patient tolerated well liquid nitrogen was used, 3 cycles of freezing and thawing for about 2 to 3 seconds, Band-Aid applied, follow-up as needed.   Psychiatric: He has a normal mood and affect. His behavior is normal. Thought content normal.       PFSH     Family History   Problem Relation Age of Onset     Cancer Mother      Heart disease Mother      Arthritis Father      Hearing loss Father      Social History     Socioeconomic History     Marital status:      Spouse name: Not on file     Number of children: Not on file     Years of education: Not on file     Highest education level: Not on file   Occupational History     Occupation:    Social Needs     Financial resource strain: Not on file     Food insecurity:     Worry: Not on file     Inability: Not  on file     Transportation needs:     Medical: Not on file     Non-medical: Not on file   Tobacco Use     Smoking status: Never Smoker     Smokeless tobacco: Never Used   Substance and Sexual Activity     Alcohol use: Yes     Frequency: 2-3 times a week     Drug use: Never     Sexual activity: Yes   Lifestyle     Physical activity:     Days per week: Not on file     Minutes per session: Not on file     Stress: Not on file   Relationships     Social connections:     Talks on phone: Not on file     Gets together: Not on file     Attends Restorationist service: Not on file     Active member of club or organization: Not on file     Attends meetings of clubs or organizations: Not on file     Relationship status: Not on file     Intimate partner violence:     Fear of current or ex partner: Not on file     Emotionally abused: Not on file     Physically abused: Not on file     Forced sexual activity: Not on file   Other Topics Concern     Not on file   Social History Narrative     Not on file     Relevant history was reviewed with the patient today, unless noted in HPI, nothing is pertinent for this visit.  [facility]     Patient Active Problem List    Diagnosis Date Noted     Gastroesophageal reflux disease without esophagitis 06/17/2019     Arthritis      High cholesterol      Past Surgical History:   Procedure Laterality Date     ANKLE FUSION Right      APPENDECTOMY       INGUINAL HERNIA REPAIR         RESULTS/CONSULTS (Lab/Rad)   No results found for this or any previous visit (from the past 168 hour(s)).  No results found.  MEDICATIONS     Current Outpatient Medications on File Prior to Visit   Medication Sig Dispense Refill     ASCORBIC ACID, VITAMIN C, ORAL Take by mouth.       cholecalciferol, vitamin D3, (VITAMIN D3) 2,000 unit capsule        DICLOFENAC SODIUM ORAL Take by mouth as needed.              green tea leaf extract (GREEN TEA) cap daily       ibuprofen (ADVIL) 200 MG tablet Take by mouth.       loratadine (CLARITIN)  10 mg tablet Take by mouth.       MULTIVITAMIN ORAL Take by mouth.       omega-3 fatty acids (FISH OIL CONCENTRATE ORAL) Take by mouth.       omeprazole (PRILOSEC) 20 MG capsule TAKE 1 CAP BY MOUTH ONCE DAILY. 90 capsule 3     No current facility-administered medications on file prior to visit.        HEALTH MAINTENANCE / SCREENING   PHQ-2 Total Score: 0 (6/17/2019  7:41 AM)  , No data recorded,No data recorded  Immunization History   Administered Date(s) Administered     Influenza [id number], 11/18/2009     Influenza, Seasonal, Inj PF IIV3 09/24/2009, 10/08/2010, 10/11/2011, 09/27/2012     Influenza,inj,MDCK,PF,Quad >4yrs 10/12/2018     Influenza,seasonal, Inj IIV3 10/25/2003, 12/23/2004, 01/04/2006, 10/23/2006, 10/24/2007, 10/28/2008     Tdap 02/01/2008, 06/14/2017, 01/08/2018     ZOSTER, RECOMBINANT, IM 09/23/2019     Health Maintenance   Topic     HEPATITIS C SCREENING      HIV SCREENING      INFLUENZA VACCINE RULE BASED (1)     ZOSTER VACCINES (2 of 2)     PREVENTIVE CARE VISIT      COLONOSCOPY      ADVANCE CARE PLANNING      TD 18+ HE      TDAP ADULT ONE TIME DOSE        Juarez Chamberlain MD  Family Medicine, [facility]     This note was dictated using a voice recognition software.  Any grammatical or context distortion are unintentional and inherent to the software.

## 2021-06-03 VITALS — BODY MASS INDEX: 28.18 KG/M2 | HEIGHT: 71 IN | WEIGHT: 201.25 LBS

## 2021-06-03 VITALS
HEART RATE: 65 BPM | SYSTOLIC BLOOD PRESSURE: 118 MMHG | WEIGHT: 199.25 LBS | OXYGEN SATURATION: 96 % | DIASTOLIC BLOOD PRESSURE: 80 MMHG | BODY MASS INDEX: 27.79 KG/M2

## 2021-06-04 VITALS
OXYGEN SATURATION: 98 % | HEART RATE: 64 BPM | SYSTOLIC BLOOD PRESSURE: 115 MMHG | DIASTOLIC BLOOD PRESSURE: 80 MMHG | BODY MASS INDEX: 27.72 KG/M2 | WEIGHT: 198.75 LBS

## 2021-06-04 NOTE — PROGRESS NOTES
OFFICE VISIT - FAMILY MEDICINE     ASSESSMENT AND PLAN     1. Plantar wart of left foot  Ambulatory referral to Podiatry   Liquid nitrogen treatment of left plantar wart today, patient tolerated well, referral to see podiatrist was placed in case he fails to improve.    CHIEF COMPLAINT   Plantar Warts (bottom lt. foot; treatment)    HPI   Juan Carlos Helton is a 64 y.o. male.  No Patient Care Coordination Note on file.  Left plantar wart treated with liquid nitrogen last October, did feel better, but it seems to be recurring.  With mild pain and pressure in the plantar forefoot area.    Review of Systems As per HPI, otherwise negative.    OBJECTIVE   /80 (Patient Site: Right Arm, Patient Position: Sitting, Cuff Size: Adult Regular)   Pulse 64   Wt 198 lb 12 oz (90.2 kg)   SpO2 98%   BMI 27.72 kg/m    Physical Exam  Left plantar wart x2 (0.5 cm and 1 cm), area was pared 11 blade, liquid nitrogen applied x3.  Patient tolerated well.   Kelly noted.  PFSH     Family History   Problem Relation Age of Onset     Cancer Mother      Heart disease Mother      Arthritis Father      Hearing loss Father      Social History     Socioeconomic History     Marital status:      Spouse name: Not on file     Number of children: Not on file     Years of education: Not on file     Highest education level: Not on file   Occupational History     Occupation:    Social Needs     Financial resource strain: Not on file     Food insecurity:     Worry: Not on file     Inability: Not on file     Transportation needs:     Medical: Not on file     Non-medical: Not on file   Tobacco Use     Smoking status: Never Smoker     Smokeless tobacco: Never Used   Substance and Sexual Activity     Alcohol use: Yes     Frequency: 2-3 times a week     Drug use: Never     Sexual activity: Yes   Lifestyle     Physical activity:     Days per week: Not on file     Minutes per session: Not on file     Stress: Not on file    Relationships     Social connections:     Talks on phone: Not on file     Gets together: Not on file     Attends Confucianist service: Not on file     Active member of club or organization: Not on file     Attends meetings of clubs or organizations: Not on file     Relationship status: Not on file     Intimate partner violence:     Fear of current or ex partner: Not on file     Emotionally abused: Not on file     Physically abused: Not on file     Forced sexual activity: Not on file   Other Topics Concern     Not on file   Social History Narrative     Not on file     Relevant history was reviewed with the patient today, unless noted in HPI, nothing is pertinent for this visit.  [facility]     Patient Active Problem List    Diagnosis Date Noted     Gastroesophageal reflux disease without esophagitis 06/17/2019     Arthritis      High cholesterol      Past Surgical History:   Procedure Laterality Date     ANKLE FUSION Right      APPENDECTOMY       INGUINAL HERNIA REPAIR         RESULTS/CONSULTS (Lab/Rad)   No results found for this or any previous visit (from the past 168 hour(s)).  No results found.  MEDICATIONS     Current Outpatient Medications on File Prior to Visit   Medication Sig Dispense Refill     ASCORBIC ACID, VITAMIN C, ORAL Take by mouth.       cholecalciferol, vitamin D3, (VITAMIN D3) 2,000 unit capsule        DICLOFENAC SODIUM ORAL Take by mouth as needed.              green tea leaf extract (GREEN TEA) cap daily       ibuprofen (ADVIL) 200 MG tablet Take by mouth.       loratadine (CLARITIN) 10 mg tablet Take by mouth.       MULTIVITAMIN ORAL Take by mouth.       omega-3 fatty acids (FISH OIL CONCENTRATE ORAL) Take by mouth.       omeprazole (PRILOSEC) 20 MG capsule TAKE 1 CAP BY MOUTH ONCE DAILY. 90 capsule 3     No current facility-administered medications on file prior to visit.        HEALTH MAINTENANCE / SCREENING   PHQ-2 Total Score: 0 (6/17/2019  7:41 AM)  , No data recorded,No data  recorded  Immunization History   Administered Date(s) Administered     Influenza [id number], 11/18/2009     Influenza, Seasonal, Inj PF IIV3 09/24/2009, 10/08/2010, 10/11/2011, 09/27/2012     Influenza,inj,MDCK,PF,Quad >4yrs 10/12/2018     Influenza,seasonal, Inj IIV3 10/25/2003, 12/23/2004, 01/04/2006, 10/23/2006, 10/24/2007, 10/28/2008     Tdap 02/01/2008, 06/14/2017, 01/08/2018     ZOSTER, RECOMBINANT, IM 09/23/2019, 11/24/2019     Health Maintenance   Topic     HEPATITIS C SCREENING      HIV SCREENING      ZOSTER VACCINES (2 of 2)     PREVENTIVE CARE VISIT      COLONOSCOPY      LIPID      ADVANCE CARE PLANNING      TD 18+ HE      INFLUENZA VACCINE RULE BASED      TDAP ADULT ONE TIME DOSE        Juarez Chamberlain MD  Family Medicine, [facility]     This note was dictated using a voice recognition software.  Any grammatical or context distortion are unintentional and inherent to the software.

## 2021-06-05 VITALS — HEIGHT: 72 IN | BODY MASS INDEX: 24.48 KG/M2 | BODY MASS INDEX: 24.48 KG/M2 | HEIGHT: 72 IN

## 2021-06-05 VITALS — WEIGHT: 178 LBS | BODY MASS INDEX: 24.48 KG/M2 | BODY MASS INDEX: 24.48 KG/M2 | WEIGHT: 178 LBS

## 2021-06-05 VITALS — BODY MASS INDEX: 24.83 KG/M2 | BODY MASS INDEX: 24.48 KG/M2 | HEIGHT: 72 IN | WEIGHT: 178 LBS

## 2021-06-05 NOTE — PROGRESS NOTES
FOOT AND ANKLE SURGERY/PODIATRY CONSULT NOTE         ASSESSMENT:   Verruca plantaris left foot  Cavus foot deformity  Plantarflexed metatarsals      TREATMENT:  I debrided the hyperkeratotic lesion left foot today.  I applied cantharidin in an attempt to successfully treat the wart.  I informed the patient it may take several treatments to successfully treat the wart.  I have also recommended orthotics.        HPI: I was asked to see Juan Carlos Helton today to evaluate and treat a painful wart on the bottom of the left foot.the patient indicated that he has had this wart for several weeks.  He has had it treated in the past by his primary care physician with cryotherapy.  The patient stated that the wart can be quite painful with weightbearing and ambulation.  He was standing on a hard concrete surface recently at work and by the end of the day he has severe pain in the bottom of his left foot.  He has not noticed any active drainage or bleeding.  The pain is relieved with nonweightbearing.  The patient was seen in consultation at the request of Juarez Chamberlain MD for evaluation and treatment of painful wart left foot.     Past Medical History:   Diagnosis Date     Arthritis      High cholesterol        Past Surgical History:   Procedure Laterality Date     ANKLE FUSION Right      APPENDECTOMY       INGUINAL HERNIA REPAIR         No Known Allergies      Current Outpatient Medications:      ASCORBIC ACID, VITAMIN C, ORAL, Take by mouth., Disp: , Rfl:      cholecalciferol, vitamin D3, (VITAMIN D3) 2,000 unit capsule, , Disp: , Rfl:      DICLOFENAC SODIUM ORAL, Take by mouth as needed.    , Disp: , Rfl:      green tea leaf extract (GREEN TEA) cap, daily, Disp: , Rfl:      ibuprofen (ADVIL) 200 MG tablet, Take by mouth., Disp: , Rfl:      loratadine (CLARITIN) 10 mg tablet, Take by mouth., Disp: , Rfl:      MULTIVITAMIN ORAL, Take by mouth., Disp: , Rfl:      omega-3 fatty acids (FISH OIL CONCENTRATE ORAL), Take by mouth.,  Disp: , Rfl:      omeprazole (PRILOSEC) 20 MG capsule, TAKE 1 CAP BY MOUTH ONCE DAILY., Disp: 90 capsule, Rfl: 3    Family History   Problem Relation Age of Onset     Cancer Mother      Heart disease Mother      Arthritis Father      Hearing loss Father        Social History     Socioeconomic History     Marital status:      Spouse name: Not on file     Number of children: Not on file     Years of education: Not on file     Highest education level: Not on file   Occupational History     Occupation:    Social Needs     Financial resource strain: Not on file     Food insecurity:     Worry: Not on file     Inability: Not on file     Transportation needs:     Medical: Not on file     Non-medical: Not on file   Tobacco Use     Smoking status: Never Smoker     Smokeless tobacco: Never Used   Substance and Sexual Activity     Alcohol use: Yes     Frequency: 2-3 times a week     Drug use: Never     Sexual activity: Yes   Lifestyle     Physical activity:     Days per week: Not on file     Minutes per session: Not on file     Stress: Not on file   Relationships     Social connections:     Talks on phone: Not on file     Gets together: Not on file     Attends Hinduism service: Not on file     Active member of club or organization: Not on file     Attends meetings of clubs or organizations: Not on file     Relationship status: Not on file     Intimate partner violence:     Fear of current or ex partner: Not on file     Emotionally abused: Not on file     Physically abused: Not on file     Forced sexual activity: Not on file   Other Topics Concern     Not on file   Social History Narrative     Not on file       Review of Systems - Patient denies fever, chills, rash, wound, stiffness, limping, numbness, weakness, heart burn, blood in stool, chest pain with activity, calf pain when walking, shortness of breath with activity, chronic cough, easy bleeding/bruising, swelling of ankles, excessive thirst,  fatigue, depression, anxiety.  Patient admits to painful wart left foot.      OBJECTIVE:  Appearance: alert, well appearing, and in no distress.    There were no vitals filed for this visit.    BMI= There is no height or weight on file to calculate BMI.    General appearance: Patient is alert and fully cooperative with history & exam.  No sign of distress is noted during the visit.  Psychiatric: Affect is pleasant & appropriate.  Patient appears motivated to improve health.  Respiratory: Breathing is regular & unlabored while sitting.  HEENT: Hearing is intact to spoken word.  Speech is clear.  No gross evidence of visual impairment that would impact ambulation.    Vascular: Dorsalis pedis and posterior tibial pulses are palpable. There is pedal hair growth bilaterally.  CFT < 3 sec from anterior tibial surface to distal digits bilaterally. There is no appreciable edema noted.  Dermatologic: Large round hyperkeratotic nucleated lesion sub-fourth metatarsal head left foot with pinpoint bleeding noted at the base of this lesion.  There is pain on direct and side to side  pressure.  Turgor and texture are within normal limits.  No other primary or secondary lesions noted.  Neurologic: All epicritic and proprioceptive sensations are grossly intact bilaterally.  Musculoskeletal: All active and passive ankle, subtalar, midtarsal, and 1st MPJ range of motion are grossly intact without pain or crepitus, with the exception of none. Manual muscle strength is within normal limits bilaterally. All dorsiflexors, plantarflexors, invertors, evertors are intact bilaterally. Tenderness present to sub-fourth metatarsal head left foot on palpation.  No tenderness to left foot with range of motion.  Large firm palpable subcutaneous mass subsecond third fourth metatarsals left foot.  Increased height of the medial longitudinal arch noted left foot.  Calf is soft/non-tender /without warmth/induration    Imaging:     No results found.            Perico Neri; SHELDONM  [facility]/Podiatry

## 2021-06-06 NOTE — PROGRESS NOTES
"S: Pt.. seen in [facility]. Male. 6'0\", 195 lbs. Dr. Neri. RX: Custom Foot orthotics. DX: Plantar metatarsal bone of left foot. Cavus deformity of left foot.   O: Pt. has not had FO in the past. Surgeries of right heel and straightening of right 2nd toe. DX for many years.   A: G: Pain in feet 3/10. Pes Cavus feet that reduce to Pes Neutral upon weight bearing. B/L ankle valgus. Right foot/ankle pain medial ache. Heel lift inverted. ROM within norm. 1st ray flexible. HDS B/L toes 2-5. Callous formations on MTH's 2-3 plantar. Today I C/M for Custom [place] FO. FO to provide total contact with plantar surface. Lift and support medial long. arches. Met bar to offload B/L MTH's and lessen pain of HDS. FO made with 4mm co-poly, Poron and CLARENCE.   P: Pt. to be seen for F/D.     Hao THOMAS,MIRZA  "

## 2021-06-06 NOTE — PATIENT INSTRUCTIONS - HE
Please follow up if you have any further issues.    You may contact me by phone or [facility] if you are worsening or if things are not improving.    Thank you for coming in today.

## 2021-06-07 NOTE — PROGRESS NOTES
"S: Pt.. seen in [place] office. Male. 6'0\", 195 lbs. Dr. Neri. RX: Custom Foot orthotics. DX: Plantar metatarsal bone of left foot. Cavus deformity of left foot.   O: Condition unchanged since initial evaluation. Pt. has not had FO in the past. Surgeries of right heel and straightening of right 2nd toe. DX for many years.   A: G: Pain in feet 3/10. Pes Cavus feet that reduce to Pes Neutral upon weight bearing. B/L ankle valgus. Right foot/ankle pain medial ache. Heel lift inverted. ROM within norm. 1st ray flexible. HDS B/L toes 2-5. Callous formations on MTH's 2-3 plantar. Today I F/D Custom [employer] FO. FO provide total contact with plantar surface. Lift and support medial long. arches. Met bar offloads B/L MTH's and lessen pain of HDS. FO made with 4mm co-poly, Poron and CLARENCE. Overall fit is good. Pt. happy with fit and support. Donning doffing wear and care along with break in schedule given.  P: Pt. to be seen as needed.     Hao THOMAS,LO  "

## 2021-06-09 NOTE — TELEPHONE ENCOUNTER
Refill Approved    Rx renewed per Medication Renewal Policy. Medication was last renewed on 8/26/19, last OV 2/10/20.    Karissa Perez, [facility] Triage/Med Refill 7/25/2020     Requested Prescriptions   Pending Prescriptions Disp Refills     omeprazole (PRILOSEC) 20 MG capsule [Pharmacy Med Name: OMEPRAZOLE DR 20 MG CAPSULE] 90 capsule 3     Sig: TAKE 1 CAPSULE BY MOUTH EVERY DAY       GI Medications Refill Protocol Passed - 7/23/2020  8:28 AM        Passed - PCP or prescribing provider visit in last 12 or next 3 months.     Last office visit with prescriber/PCP: 12/9/2019 Juarez Chamberlain MD OR same dept: 12/9/2019 Juarez Chamberlain MD OR same specialty: 12/9/2019 Juarez Chamberlain MD  Last physical: 6/17/2019 Last MTM visit: Visit date not found   Next visit within 3 mo: Visit date not found  Next physical within 3 mo: Visit date not found  Prescriber OR PCP: Juarez Chamberlain MD  Last diagnosis associated with med order: 1. Gastroesophageal reflux disease without esophagitis  - omeprazole (PRILOSEC) 20 MG capsule [Pharmacy Med Name: OMEPRAZOLE DR 20 MG CAPSULE]; TAKE 1 CAPSULE BY MOUTH EVERY DAY  Dispense: 90 capsule; Refill: 3    If protocol passes may refill for 12 months if within 3 months of last provider visit (or a total of 15 months).

## 2021-06-10 NOTE — PROGRESS NOTES
FOOT AND ANKLE SURGERY/PODIATRY Progress Note        ASSESSMENT:   Plantarflexed metatarsal left foot    HPI: Juan Carlos Helton was seen again today for evaluation of a mildly painful left foot.  The patient stated that several weeks ago he had some severe pain in the ball of his left foot.  He mentioned that after being treated with cantharidin his warts improved.  He began to notice some pain on the ball of his left foot particularly when walking on a hard surface without shoe gear.  It was a severe pain but today he stated that he has markedly improved.  He has very little discomfort.  He never did notice any significant redness or swelling.  The pain was easily relieved with nonweightbearing.  He denies any recent trauma to his left foot.  He has been wearing his orthotics.    Past Medical History:   Diagnosis Date     Arthritis      High cholesterol        Past Surgical History:   Procedure Laterality Date     ANKLE FUSION Right     S/P subtalar arthrodesis, PTT synoverctomy, 2 MT osteotomy, IP joint hemiresection, EDL/gastroc lengthening 8/4/2011     APPENDECTOMY       INGUINAL HERNIA REPAIR         No Known Allergies      Current Outpatient Medications:      ASCORBIC ACID, VITAMIN C, ORAL, Take by mouth., Disp: , Rfl:      cholecalciferol, vitamin D3, (VITAMIN D3) 2,000 unit capsule, , Disp: , Rfl:      DICLOFENAC SODIUM ORAL, Take by mouth as needed.    , Disp: , Rfl:      green tea leaf extract (GREEN TEA) cap, daily, Disp: , Rfl:      ibuprofen (ADVIL) 200 MG tablet, Take by mouth., Disp: , Rfl:      loratadine (CLARITIN) 10 mg tablet, Take by mouth., Disp: , Rfl:      MULTIVITAMIN ORAL, Take by mouth., Disp: , Rfl:      omega-3 fatty acids (FISH OIL CONCENTRATE ORAL), Take by mouth., Disp: , Rfl:      omeprazole (PRILOSEC) 20 MG capsule, TAKE 1 CAPSULE BY MOUTH EVERY DAY, Disp: 90 capsule, Rfl: 1    Family History   Problem Relation Age of Onset     Cancer Mother      Heart disease Mother      Arthritis  Father      Hearing loss Father        Social History     Socioeconomic History     Marital status:      Spouse name: Not on file     Number of children: Not on file     Years of education: Not on file     Highest education level: Not on file   Occupational History     Occupation:    Social Needs     Financial resource strain: Not on file     Food insecurity     Worry: Not on file     Inability: Not on file     Transportation needs     Medical: Not on file     Non-medical: Not on file   Tobacco Use     Smoking status: Never Smoker     Smokeless tobacco: Never Used   Substance and Sexual Activity     Alcohol use: Yes     Frequency: 2-3 times a week     Drug use: Never     Sexual activity: Yes   Lifestyle     Physical activity     Days per week: Not on file     Minutes per session: Not on file     Stress: Not on file   Relationships     Social connections     Talks on phone: Not on file     Gets together: Not on file     Attends [employer] service: Not on file     Active member of club or organization: Not on file     Attends meetings of clubs or organizations: Not on file     Relationship status: Not on file     Intimate partner violence     Fear of current or ex partner: Not on file     Emotionally abused: Not on file     Physically abused: Not on file     Forced sexual activity: Not on file   Other Topics Concern     Not on file   Social History Narrative     Not on file       10 point Review of Systems is negative        Vitals:    07/28/20 0934   BP: 132/90   Pulse: 68   Temp: 98.7  F (37.1  C)       BMI= Body mass index is 27.4 kg/m .    OBJECTIVE:  General appearance: Patient is alert and fully cooperative with history & exam.  No sign of distress is noted during the visit.  Vascular: Dorsalis pedis and posterior tibial pulses are palpable. There is pedal hair growth bilaterally.  CFT < 3 sec from anterior tibial surface to distal digits bilaterally. There is no appreciable edema  noted.  Dermatologic: Large round hyperkeratotic nucleated lesion sub-fourth metatarsal head left foot with no pinpoint bleeding noted at the base of this lesion.  There is pain on direct and side to side  pressure.  Turgor and texture are within normal limits.  No other primary or secondary lesions noted.  Neurologic: All epicritic and proprioceptive sensations are grossly intact bilaterally.  Musculoskeletal: All active and passive ankle, subtalar, midtarsal, and 1st MPJ range of motion are grossly intact without pain or crepitus, with the exception of none. Manual muscle strength is within normal limits bilaterally. All dorsiflexors, plantarflexors, invertors, evertors are intact bilaterally. Tenderness present to sub-fourth metatarsal head left foot on palpation.  No tenderness to left foot with range of motion.  Large firm palpable subcutaneous mass subsecond third fourth metatarsals left foot.  Increased height of the medial longitudinal arch noted left foot.  Calf is soft/non-tender /without warmth/induration    Imaging:     No results found.         TREATMENT:  I informed the patient that there is no evidence of any wart.  I told the patient that he probably was having some irritation to the metatarsal head due to the position of the metatarsals of his left foot.  I recommend he continue to wear his orthotics and return to the clinic as needed.  No further treatment is needed at this time.        Perico Neri; MICHAEL  [facility] Foot & Ankle Surgery/Podiatry

## 2021-06-11 NOTE — TELEPHONE ENCOUNTER
Upcoming Appointment Question  When is the appointment: 09/08/20  What is your appointment for?: Physical  Who is your appointment scheduled with?: PCP   What is your question/concern?: patient calling back regarding vm he received at his home. States message asked him to call back to reschedule upcoming appointment.  When writer attempt to reschedule Epic scheduling system shows provider available for in person visit that same week, which is different than message patient received.   Please clarify for patient if reschedule is needed   Okay to leave a detailed message?: Yes

## 2021-06-11 NOTE — TELEPHONE ENCOUNTER
No need to change appointment, Dr. Chamberlain is seeing patient's [id number] at [facility].

## 2021-06-11 NOTE — TELEPHONE ENCOUNTER
Spoke w/patient, relayed message below. Dr. Chamberlain is seeing patient's 9/8/20 at [facility]. Patient verbalized understanding.

## 2021-06-11 NOTE — PROGRESS NOTES
Assessment:      Healthy male exam.        Plan:     1.  Slowly taper off omeprazole, can transition to Pepcid, consider EGD if more symptoms occur.  Minimize use of NSAIDs.  2. Patient Counseling:  --Nutrition: Stressed importance of moderation in sodium/caffeine intake, saturated fat and cholesterol, caloric balance, sufficient intake of fresh fruits, vegetables, fiber, calcium, iron.  --Discussed the issue of calcium supplement, and the daily use of baby aspirin.  --Exercise: Stressed the importance of regular exercise.   --Substance Abuse: Discussed cessation/primary prevention of tobacco, alcohol, or other drug use; driving or other dangerous activities under the influence; availability of treatment for abuse.    --Sexuality: Discussed sexually transmitted diseases, partner selection, use of condoms, avoidance of unintended pregnancy.  --Injury prevention: Discussed safety belts, safety helmets, smoke detector, smoking near bedding or upholstery.   --Dental health: Discussed importance of regular tooth brushing, flossing, and dental visits.  --Immunizations reviewed.  --Discussed timing and benefits of screening colonoscopy and alternative options.  --After hours service discussed with patient             -- I have had an Advance Directives discussion with the patient.  The following high BMI interventions were performed this visit: encouragement to exercise    3. Discussed the patient's BMI with him.  The BMI is above average; BMI management plan is completed  4. Follow up as needed for acute illness     Subjective:      Juan Carlos Helton is a 65 y.o. male who presents for an annual exam. The patient reports that there is not domestic violence in his life.   Overall has been doing fine, no major complaint.  Biking 8 to 10 miles couple of times a week.  Taking omeprazole 20 mg for heartburn, has been on the medication for several years.  Taking diclofenac for body aches arthritis.  Healthy Habits:   Regular  Exercise: Yes  Sunscreen Use: Yes  Healthy Diet: Yes  Dental Visits Regularly: Yes  Seat Belt: Yes  Sexually active: Yes  Monthly Self Testicular Exams:  Yes  Hemoccults: N/A  Flex Sig: N/A  Colonoscopy: Yes and due in 2023  Lipid Profile: Yes  Glucose Screen: Yes  Prevention of Osteoporosis: Yes  Last Dexa: N/A  Guns at Home:  N/A      Immunization History   Administered Date(s) Administered     Influenza [id number], 11/18/2009     Influenza, Seasonal, Inj PF IIV3 09/24/2009, 10/08/2010, 10/11/2011, 09/27/2012     Influenza,inj,MDCK,PF,Quad >4yrs 10/12/2018     Influenza,seasonal, Inj IIV3 10/25/2003, 12/23/2004, 01/04/2006, 10/23/2006, 10/24/2007, 10/28/2008     Tdap 02/01/2008, 06/14/2017, 01/08/2018     ZOSTER, RECOMBINANT, IM 09/23/2019, 11/24/2019     Immunization status: stated as current, but no records available, will get flu shot at work.  Vision Screening:both eyes  Hearing: PASS     Current Outpatient Medications   Medication Sig Dispense Refill     ASCORBIC ACID, VITAMIN C, ORAL Take by mouth.       cholecalciferol, vitamin D3, (VITAMIN D3) 2,000 unit capsule        DICLOFENAC SODIUM ORAL Take by mouth as needed.              green tea leaf extract (GREEN TEA) cap daily       ibuprofen (ADVIL) 200 MG tablet Take by mouth.       loratadine (CLARITIN) 10 mg tablet Take by mouth.       MULTIVITAMIN ORAL Take by mouth.       omega-3 fatty acids (FISH OIL CONCENTRATE ORAL) Take by mouth.       omeprazole (PRILOSEC) 20 MG capsule TAKE 1 CAPSULE BY MOUTH EVERY DAY 90 capsule 1     No current facility-administered medications for this visit.      Past Medical History:   Diagnosis Date     Arthritis      High cholesterol      Past Surgical History:   Procedure Laterality Date     ANKLE FUSION Right     S/P subtalar arthrodesis, PTT synoverctomy, 2 MT osteotomy, IP joint hemiresection, EDL/gastroc lengthening 8/4/2011     APPENDECTOMY       INGUINAL HERNIA REPAIR       Patient has no known allergies.  Family History    Problem Relation Age of Onset     Cancer Mother      Heart disease Mother      Arthritis Father      Hearing loss Father      Social History     Socioeconomic History     Marital status:      Spouse name: Not on file     Number of children: Not on file     Years of education: Not on file     Highest education level: Not on file   Occupational History     Occupation:    Social Needs     Financial resource strain: Not on file     Food insecurity     Worry: Not on file     Inability: Not on file     Transportation needs     Medical: Not on file     Non-medical: Not on file   Tobacco Use     Smoking status: Never Smoker     Smokeless tobacco: Never Used   Substance and Sexual Activity     Alcohol use: Yes     Frequency: 2-3 times a week     Drug use: Never     Sexual activity: Yes   Lifestyle     Physical activity     Days per week: Not on file     Minutes per session: Not on file     Stress: Not on file   Relationships     Social connections     Talks on phone: Not on file     Gets together: Not on file     Attends [employer]ology service: Not on file     Active member of club or organization: Not on file     Attends meetings of clubs or organizations: Not on file     Relationship status: Not on file     Intimate partner violence     Fear of current or ex partner: Not on file     Emotionally abused: Not on file     Physically abused: Not on file     Forced sexual activity: Not on file   Other Topics Concern     Not on file   Social History Narrative     Not on file     No specialty comments available.  Review of Systems  General:  Denies problem  Eyes: Denies problem  Ears/Nose/Throat: Denies problem  Cardiovascular: Denies problem  Respiratory:  Denies problem  Gastrointestinal:  Denies problem  Genitourinary: Denies problem  Musculoskeletal:  Denies problem  Skin: Denies problem  Neurologic: Denies problem  Psychiatric: Denies problem  Endocrine: Denies problem  Heme/Lymphatic: Denies problem  "  Allergic/Immunologic: Denies problem        Objective:     Vitals:    09/08/20 0858   BP: 125/70   Pulse: 61   Temp: 98.1  F (36.7  C)   TempSrc: Tympanic   SpO2: 97%   Weight: 203 lb (92.1 kg)   Height: 5' 11\" (1.803 m)     Body mass index is 28.31 kg/m .    Physical  General Appearance: Alert, cooperative, no distress, appears stated age  Head: Normocephalic, without obvious abnormality, atraumatic  Eyes: PERRL, conjunctiva/corneas clear, EOM's intact  Ears: Normal TM's and external ear canals, both ears  Nose: Nares normal, septum midline,mucosa normal, no drainage  Throat: Lips, mucosa, and tongue normal; teeth and gums normal  Neck: Supple, symmetrical, trachea midline, no adenopathy;  thyroid: not enlarged, symmetric, no tenderness/mass/nodules; no carotid bruit or JVD  Back: Symmetric, no curvature, ROM normal, no CVA tenderness  Lungs: Clear to auscultation bilaterally, respirations unlabored  Heart: Regular rate and rhythm, S1 and S2 normal, no murmur, rub, or gallop,  Abdomen: Soft, non-tender, bowel sounds active all four quadrants,  no masses, no organomegaly  Genitourinary: Penis normal. Right and left testis are descended.No palpable masses.   Rectal: No visible external lesion, soft, symmetric mildly enlarged prostate with no palpable nodules.  Musculoskeletal: Normal range of motion. No joint swelling or deformity.   Extremities: Extremities normal, atraumatic, no cyanosis or edema  Skin: Skin color, texture, turgor normal, no rashes or lesions  Lymph nodes: Cervical, supraclavicular, and axillary nodes normal  Neurologic: He is alert. He has normal reflexes.   Psychiatric: He has a normal mood and affect.        Juarez Chamberlain MD        Juan Carlos was seen today for annual exam.    Diagnoses and all orders for this visit:    Visit for preventive health examination    Gastroesophageal reflux disease without esophagitis    High cholesterol  -     Lipid Cascade    Screening for HIV without presence " of risk factors  -     HIV Antigen/Antibody Screening Cascade    Encounter for HCV screening test for low risk patient  -     Hepatitis C Antibody (Anti-HCV)    Screening for endocrine, nutritional, metabolic and immunity disorder  -     Glycosylated Hemoglobin A1c  -     Comprehensive Metabolic Panel  -     Thyroid [place]    Screening for prostate cancer  -     PSA (Prostatic-Specific Antigen), Annual Screen

## 2021-06-13 NOTE — TELEPHONE ENCOUNTER
Refill Approved    Rx renewed per Medication Renewal Policy. Medication was last renewed on 7/25/20.    Sharon Iglesias, [facility] Triage/Med Refill 12/18/2020     Requested Prescriptions   Pending Prescriptions Disp Refills     omeprazole (PRILOSEC) 20 MG capsule [Pharmacy Med Name: OMEPRAZOLE DR 20 MG CAPSULE] 90 capsule 1     Sig: TAKE 1 CAPSULE BY MOUTH EVERY DAY       GI Medications Refill Protocol Passed - 12/17/2020  3:11 AM        Passed - PCP or prescribing provider visit in last 12 or next 3 months.     Last office visit with prescriber/PCP: 12/9/2019 Juarez Chamberlain MD OR same dept: Visit date not found OR same specialty: 12/9/2019 Juarez Chamberlain MD  Last physical: 9/8/2020 Last MTM visit: Visit date not found   Next visit within 3 mo: Visit date not found  Next physical within 3 mo: Visit date not found  Prescriber OR PCP: Juarez Chamberlain MD  Last diagnosis associated with med order: 1. Gastroesophageal reflux disease without esophagitis  - omeprazole (PRILOSEC) 20 MG capsule [Pharmacy Med Name: OMEPRAZOLE DR 20 MG CAPSULE]; TAKE 1 CAPSULE BY MOUTH EVERY DAY  Dispense: 90 capsule; Refill: 1    If protocol passes may refill for 12 months if within 3 months of last provider visit (or a total of 15 months).

## 2021-06-14 NOTE — PROGRESS NOTES
FOOT AND ANKLE SURGERY/PODIATRY Progress Note        ASSESSMENT:   Plantarflexed metatarsal left foot  Intractable plantar keratoma left foot  Hammertoes digits 2 through 5 left foot     HPI: Juan Carlos Helton was seen again today for evaluation of a mildly painful left foot.  The patient stated that several weeks ago he had some severe pain in the ball of his left foot.  The pain is still quite aggravated when  walking on a hard surface without shoe gear. The pain was easily relieved with nonweightbearing.  He denies any recent trauma to his left foot.  He has been wearing his orthotics.    Past Medical History:   Diagnosis Date     Arthritis      High cholesterol        Past Surgical History:   Procedure Laterality Date     ANKLE FUSION Right     S/P subtalar arthrodesis, PTT synoverctomy, 2 MT osteotomy, IP joint hemiresection, EDL/gastroc lengthening 8/4/2011     APPENDECTOMY       INGUINAL HERNIA REPAIR         No Known Allergies      Current Outpatient Medications:      ASCORBIC ACID, VITAMIN C, ORAL, Take by mouth., Disp: , Rfl:      cholecalciferol, vitamin D3, (VITAMIN D3) 2,000 unit capsule, , Disp: , Rfl:      DICLOFENAC SODIUM ORAL, Take by mouth as needed.    , Disp: , Rfl:      green tea leaf extract (GREEN TEA) cap, daily, Disp: , Rfl:      ibuprofen (ADVIL) 200 MG tablet, Take by mouth., Disp: , Rfl:      loratadine (CLARITIN) 10 mg tablet, Take by mouth., Disp: , Rfl:      MULTIVITAMIN ORAL, Take by mouth., Disp: , Rfl:      omega-3 fatty acids (FISH OIL CONCENTRATE ORAL), Take by mouth., Disp: , Rfl:      omeprazole (PRILOSEC) 20 MG capsule, TAKE 1 CAPSULE BY MOUTH EVERY DAY, Disp: 90 capsule, Rfl: 2    Family History   Problem Relation Age of Onset     Cancer Mother      Heart disease Mother      Arthritis Father      Hearing loss Father        Social History     Socioeconomic History     Marital status:      Spouse name: Not on file     Number of children: Not on file     Years of education:  Not on file     Highest education level: Not on file   Occupational History     Occupation:    Social Needs     Financial resource strain: Not on file     Food insecurity     Worry: Not on file     Inability: Not on file     Transportation needs     Medical: Not on file     Non-medical: Not on file   Tobacco Use     Smoking status: Never Smoker     Smokeless tobacco: Never Used   Substance and Sexual Activity     Alcohol use: Yes     Frequency: 2-3 times a week     Drug use: Never     Sexual activity: Yes   Lifestyle     Physical activity     Days per week: Not on file     Minutes per session: Not on file     Stress: Not on file   Relationships     Social connections     Talks on phone: Not on file     Gets together: Not on file     Attends Druze service: Not on file     Active member of club or organization: Not on file     Attends meetings of clubs or organizations: Not on file     Relationship status: Not on file     Intimate partner violence     Fear of current or ex partner: Not on file     Emotionally abused: Not on file     Physically abused: Not on file     Forced sexual activity: Not on file   Other Topics Concern     Not on file   Social History Narrative     Not on file       10 point Review of Systems is negative      There were no vitals filed for this visit.    BMI= There is no height or weight on file to calculate BMI.    OBJECTIVE:  General appearance: Patient is alert and fully cooperative with history & exam.  No sign of distress is noted during the visit.  Vascular: Dorsalis pedis and posterior tibial pulses are palpable. There is pedal hair growth bilaterally.  CFT < 3 sec from anterior tibial surface to distal digits bilaterally. There is no appreciable edema noted.  Dermatologic: Large round hyperkeratotic nucleated lesion sub-second metatarsal head left foot with no pinpoint bleeding noted at the base of this lesion.  There is pain on direct and side to side  pressure.   Turgor and texture are within normal limits.  No other primary or secondary lesions noted.  Neurologic: All epicritic and proprioceptive sensations are grossly intact bilaterally.  Musculoskeletal: All active and passive ankle, subtalar, midtarsal, and 1st MPJ range of motion are grossly intact without pain or crepitus, with the exception of none. Manual muscle strength is within normal limits bilaterally. All dorsiflexors, plantarflexors, invertors, evertors are intact bilaterally. Tenderness present to sub-fourth metatarsal head left foot on palpation.  No tenderness to left foot with range of motion.  Large firm palpable subcutaneous mass subsecond third fourth metatarsals left foot.  Increased height of the medial longitudinal arch noted left foot.  Digits 2 through 5 are held in flexion at the proximal to phalangeal joint left foot.  Calf is soft/non-tender without warmth/induration    Imaging:         No results found.         TREATMENT:  Debrided the painful keratoma on the plantar aspect of the left foot today.  I told the patient he may want to consider correcting the hammertoe second toe left foot which is creating retrograde pressure on the second metatarsal head.  This is contributing to his reoccurring keratoma.  The patient stated he will contemplate having this procedure done at a later date.  I would recommend a second metatarsal head resection as well as a hammertoe correction and extensor tenotomy second toe all left foot.        Perico Neri; MICHAEL  [facility] Foot & Ankle Surgery/Podiatry

## 2021-06-15 NOTE — TELEPHONE ENCOUNTER
Reason for Call:  Medication or medication refill:    Do you use a [facility]?  Name of the pharmacy and phone number for the current request: [facility] on [address]    Name of the medication requested: diclofenac    Other request: n/a    Can we leave a detailed message on this number? No call back needed    Phone number patient can be reached at: Home number on file [phone] (home)    Best Time: asap    Call taken on 2/26/2021 at 9:29 AM by Lidya Julio

## 2021-06-16 PROBLEM — M21.6X2 PLANTAR FLEXED METATARSAL BONE OF LEFT FOOT: Status: ACTIVE | Noted: 2021-04-01

## 2021-06-16 PROBLEM — M62.40 CONTRACTED, TENDON: Status: ACTIVE | Noted: 2021-04-01

## 2021-06-16 PROBLEM — K21.9 GASTROESOPHAGEAL REFLUX DISEASE WITHOUT ESOPHAGITIS: Status: ACTIVE | Noted: 2019-06-17

## 2021-06-16 PROBLEM — M20.42 HAMMERTOE OF LEFT FOOT: Status: ACTIVE | Noted: 2021-04-01

## 2021-06-16 NOTE — TELEPHONE ENCOUNTER
Patient informed ok to add 3rd toe procedure. He would like to schedule surgery for 04/30/21. I will update order for second sign.

## 2021-06-16 NOTE — TELEPHONE ENCOUNTER
01/21/21 OV notes:   I would recommend a second metatarsal head resection as well as a hammertoe correction and extensor tenotomy second toe all left foot.

## 2021-06-16 NOTE — TELEPHONE ENCOUNTER
RN cannot approve Refill Request    RN can NOT refill this medication med is not covered by policy/route to provider. Last office visit: Visit date not found Last Physical: Visit date not found Last MTM visit: Visit date not found Last visit same specialty: 12/9/2019 Juarez Chamberlain MD.  Next visit within 3 mo: Visit date not found  Next physical within 3 mo: Visit date not found      Bailey Kruger, [facility] Triage/Med Refill 3/23/2021    Requested Prescriptions   Pending Prescriptions Disp Refills     diclofenac (VOLTAREN) 75 MG EC tablet [Pharmacy Med Name: DICLOFENAC SOD EC 75 MG TAB] 60 tablet 0     Sig: TAKE 1 TABLET (75 MG TOTAL) BY MOUTH 2 (TWO) TIMES A DAY AS NEEDED.       There is no refill protocol information for this order

## 2021-06-17 NOTE — ANESTHESIA CARE TRANSFER NOTE
Last vitals:   Vitals:    04/30/21 0827   BP: 110/65   Pulse: 60   Resp: 16   Temp: 36.2  C (97.1  F)   SpO2: 99%     Patient's level of consciousness is drowsy  Spontaneous respirations: yes  Maintains airway independently: yes  Dentition unchanged: yes  Oropharynx: oropharynx clear of all foreign objects    QCDR Measures:  ASA# 20 - Surgical Safety Checklist: WHO surgical safety checklist completed prior to induction    PQRS# 430 - Adult PONV Prevention: 4558F - Pt received => 2 anti-emetic agents (different classes) preop & intraop  ASA# 8 - Peds PONV Prevention: NA - Not pediatric patient, not GA or 2 or more risk factors NOT present  PQRS# 424 - Anabel-op Temp Management: 4559F - At least one body temp DOCUMENTED => 35.5C or 95.9F within required timeframe  PQRS# 426 - PACU Transfer Protocol: - Transfer of care checklist used  ASA# 14 - Acute Post-op Pain: ASA14B - Patient did NOT experience pain >= 7 out of 10

## 2021-06-17 NOTE — TELEPHONE ENCOUNTER
Pt was updated per Dr. Hogan- This is normal and there is nothing to be overly concerned about.  He had no further questions.

## 2021-06-17 NOTE — PROGRESS NOTES
Subjective findings: The patient return to the clinic today for postop visit #1, 1 week status post hammertoe correction digits 2 and 3 left foot second third metatarsal head resections left foot and extensor tenotomies digits 2 and 3 left foot.  The patient is a good spirits and he had no complaints.    Objective findings: The dressings were removed and the wound margins are well coaptated and maintained.  There is moderate edema noted.  No erythema or cellulitis noted.  Neurovascular status is intact.  Vital signs stable.  Surgical correction maintained.    Assessment: Hammertoe, plantarflexed metatarsals, contracted extensor tendons.    Plan: Applied a sterile dressing to the left foot today.  The patient was instructed to return to the clinic in 1 week for postop visit #2 at which time the sutures will be removed and a postop x-ray will be taken.

## 2021-06-17 NOTE — TELEPHONE ENCOUNTER
Pt stated he forgot to ask provider yesterday if he can shower. Please call pt back on cell phone.

## 2021-06-17 NOTE — PROGRESS NOTES
[address] [address]  Dept: [phone]  Dept Fax: [phone]  Primary Provider: Juarez Chamberlain MD  Pre-op Performing Provider: JUAREZ CHAMBERLAIN      PREOPERATIVE EVALUATION:  Today's date: 4/26/2021    Juan Carlos Helton is a 66 y.o. male who presents for a preoperative evaluation.    Surgical Information:  Surgery/Procedure: CORRECTION, HAMMER TOE, left second and third toe, EXCISION, METATARSAL BONE, HEAD second and third left toes  Surgery Location: [facility]  Surgeon: Perico Neri DPM  Surgery Date: 4/30/21    Time of Surgery: 700am  Where patient plans to recover: At home with family  Fax number for surgical facility: Note does not need to be faxed, will be available electronically in Epic.    Type of Anesthesia Anticipated: to be determined    Assessment & Plan      The proposed surgical procedure is considered LOW risk.    Preop general physical exam  - Hemoglobin    Hammertoe of left foot  Scheduled for surgery.    Gastroesophageal reflux disease without esophagitis  Taking omeprazole.      Risks and Recommendations:  The patient has the following additional risks and recommendations for perioperative complications:   - No identified additional risk factors other than previously addressed    Medication Instructions:   - diclofenac (Voltaren): HOLD 1 day before surgery.    - ibuprofen (Advil, Motrin): HOLD 1 day before surgery.     RECOMMENDATION:  APPROVAL GIVEN to proceed with proposed procedure, without further diagnostic evaluation.    Review of external notes as documented above       26 minutes spent on the date of the encounter doing chart review, review of outside records, review of test results, interpretation of tests, patient visit and documentation         Subjective     HPI related to upcoming procedure: Left foot second and third hammertoe causing some calluses and pain on the plantar aspect, seen by podiatrist  and deemed to be a good candidate for surgical management.  Has had a similar procedure on the right foot years ago.    Preop Questions 4/25/2021   Have you ever had a heart attack or stroke? No   Have you ever had surgery on your heart or blood vessels, such as a stent placement, a coronary artery bypass, or surgery on an artery in your head, neck, heart, or legs? No   Do you have chest pain with activity? No   Do you have a history of  heart failure? No   Do you currently have a cold, bronchitis or symptoms of other infection? No   Do you have a cough, shortness of breath, or wheezing? No   Do you or anyone in your family have previous history of blood clots? No   Do you or does anyone in your family have a serious bleeding problem such as prolonged bleeding following surgeries or cuts? No   Have you ever had problems with anemia or been told to take iron pills? No   Have you had any abnormal blood loss such as black, tarry or bloody stools? No   Have you ever had a blood transfusion? No   Are you willing to have a blood transfusion if it is medically needed before, during, or after your surgery? Yes   Have you or any of your relatives ever had problems with anesthesia? No   Do you have sleep apnea, excessive snoring or daytime drowsiness? No   Do you have any artifical heart valves or other implanted medical devices like a pacemaker, defibrillator, or continuous glucose monitor? No   Do you have artificial joints? No   Are you allergic to latex? No     Health Care Directive:  Patient does not have a Health Care Directive or Living Will: Discussed advance care planning with patient; however, patient declined at this time.    Preoperative Review of :    reviewed - controlled substances reflected in medication list.    See problem list for active medical problems.  Problems all longstanding and stable, except as noted/documented.  See ROS for pertinent symptoms related to these conditions.      Review of  Systems  Constitutional, neuro, ENT, endocrine, pulmonary, cardiac, gastrointestinal, genitourinary, musculoskeletal, integument and psychiatric systems are negative, except as otherwise noted.      Patient Active Problem List    Diagnosis Date Noted     Hammertoe of left foot 04/01/2021     Contracted, tendon 04/01/2021     Plantar flexed metatarsal bone of left foot 04/01/2021     Gastroesophageal reflux disease without esophagitis 06/17/2019     Arthritis      High cholesterol      Past Medical History:   Diagnosis Date     Arthritis      High cholesterol      Past Surgical History:   Procedure Laterality Date     ANKLE FUSION Right     S/P subtalar arthrodesis, PTT synoverctomy, 2 MT osteotomy, IP joint hemiresection, EDL/gastroc lengthening 8/4/2011     APPENDECTOMY       INGUINAL HERNIA REPAIR       Current Outpatient Medications   Medication Sig Dispense Refill     ASCORBIC ACID, VITAMIN C, ORAL Take by mouth.       cholecalciferol, vitamin D3, (VITAMIN D3) 2,000 unit capsule        green tea leaf extract (GREEN TEA) cap daily       ibuprofen (ADVIL) 200 MG tablet Take by mouth.       loratadine (CLARITIN) 10 mg tablet Take by mouth.       MULTIVITAMIN ORAL Take by mouth.       omega-3 fatty acids (FISH OIL CONCENTRATE ORAL) Take by mouth.       omeprazole (PRILOSEC) 20 MG capsule TAKE 1 CAPSULE BY MOUTH EVERY DAY 90 capsule 2     No current facility-administered medications for this visit.        No Known Allergies    Social History     Tobacco Use     Smoking status: Never Smoker     Smokeless tobacco: Never Used   Substance Use Topics     Alcohol use: Yes     Frequency: 2-3 times a week      Family History   Problem Relation Age of Onset     Cancer Mother      Heart disease Mother      Arthritis Father      Hearing loss Father      Social History     Substance and Sexual Activity   Drug Use Never        Objective     /75 (Patient Site: Left Arm, Patient Position: Sitting, Cuff Size: Adult Regular)    Pulse (!) 59   Resp 12   Wt 186 lb (84.4 kg)   SpO2 99%   BMI 25.94 kg/m    Physical Exam    GENERAL APPEARANCE: healthy, alert and no distress     EYES: EOMI,  PERRL     HENT: ear canals and TM's normal and nose and mouth without ulcers or lesions     NECK: no adenopathy, no asymmetry, masses, or scars and thyroid normal to palpation     RESP: lungs clear to auscultation - no rales, rhonchi or wheezes     CV: regular rates and rhythm, normal S1 S2, no S3 or S4 and no murmur, click or rub     ABDOMEN:  soft, nontender, no HSM or masses and bowel sounds normal     MS: hammer toe - mallet toe deformity     SKIN: no suspicious lesions or rashes     NEURO: Normal strength and tone, sensory exam grossly normal, mentation intact and speech normal     PSYCH: mentation appears normal. and affect normal/bright     LYMPHATICS: No cervical adenopathy    Recent Labs   Lab Test 04/26/21  1403 09/08/20  0937 06/17/19  0825   HGB 14.5  --  15.2   PLT  --   --  207   NA  --  136 138   K  --  4.4 4.4   CREATININE  --  0.83 0.81        PRE-OP Diagnostics:   Recent Results (from the past 48 hour(s))   Hemoglobin    Collection Time: 04/26/21  2:03 PM   Result Value Ref Range    Hemoglobin 14.5 14.0 - 18.0 g/dL   COVID-19 Virus PCR MRF    Collection Time: 04/27/21 10:33 AM    Specimen: Respiratory   Result Value Ref Range    COVID-19 VIRUS SPECIMEN SOURCE Nasopharyngeal     2019-nCOV       Test received-See reflex to IDDL test SARS CoV2 (COVID-19) Virus RT-PCR   SARS-CoV-2 (COVID-19)-PCR    Collection Time: 04/27/21 10:33 AM    Specimen: Respiratory   Result Value Ref Range    SARS-CoV-2 Virus Specimen Source Nasopharyngeal     SARS-CoV-2 PCR Result NEGATIVE     SARS-COV-2 PCR COMMENT       Testing was performed using the paco SARS-CoV-2 assay on the paco 6800     No EKG required for low risk surgery (cataract, skin procedure, breast biopsy, etc).    REVISED CARDIAC RISK INDEX (RCRI)   The patient has the following serious  cardiovascular risks for perioperative complications:   - No serious cardiac risks = 0 points    RCRI INTERPRETATION: 0 points: Class I (very low risk - 0.4% complication rate)         Signed Electronically by: Juarez Chamberlain MD    Copy of this evaluation report is provided to requesting physician.

## 2021-06-17 NOTE — TELEPHONE ENCOUNTER
Telephone Encounter by Nubia Craft at 3/26/2021  2:38 PM     Author: Nubia Craft Service: -- Author Type: --    Filed: 3/26/2021  2:42 PM Encounter Date: 3/26/2021 Status: Signed    : Nubia Craft         3/26/21 9:00 AM  Note     01/21/21 OV notes:   I would recommend a second metatarsal head resection as well as a hammertoe correction and extensor tenotomy second toe all left foot.      Lian Monroe, ROHINI  to Perico Neri DPM ? Me          3/26/21 8:36 AM  Surgery order?   Juan Carlos Helton  to Perico Neri DPM          3/26/21 8:20 AM  Dr. Neri,  we have discussed straightening the toe on my left foot to relieve the pushing on the bottom of my foot. I would like to get it done in May. How do we proceed?

## 2021-06-17 NOTE — ANESTHESIA POSTPROCEDURE EVALUATION
Patient: Juan Carlos Helton  Procedure(s):  CORRECTION, HAMMER TOE, left second and third toe, EXCISION, METATARSAL BONE, HEAD second and third left toes, RELEASE, TENDON, EXTENSOR second and third toe left foot (Left)  EXCISION, METATARSAL BONE, HEAD second and third left toes (Left)  RELEASE, TENDON, EXTENSOR second and third toe left foot (Left)  Anesthesia type: MAC    Patient location: Phase II Recovery  Last vitals:   Vitals Value Taken Time   /77 04/30/21 0916   Temp 36.2  C (97.1  F) 04/30/21 0827   Pulse 64 04/30/21 0927   Resp 16 04/30/21 0915   SpO2 98 % 04/30/21 0927   Vitals shown include unvalidated device data.  Post vital signs: stable  Level of consciousness: awake and responds to simple questions  Post-anesthesia pain: pain controlled  Post-anesthesia nausea and vomiting: no  Pulmonary: unassisted, return to baseline  Cardiovascular: stable and blood pressure at baseline  Hydration: adequate  Anesthetic events: no    QCDR Measures:  ASA# 11 - Anabel-op Cardiac Arrest: ASA11B - Patient did NOT experience unanticipated cardiac arrest  ASA# 12 - Anabel-op Mortality Rate: ASA12B - Patient did NOT die  ASA# 13 - PACU Re-Intubation Rate: ASA13B - Patient did NOT require a new airway mgmt  ASA# 10 - Composite Anes Safety: ASA10A - No serious adverse event    Additional Notes:

## 2021-06-17 NOTE — TELEPHONE ENCOUNTER
Caller: Juan Carlos    Provider: MD Perico Neri    Detailed reason for call: Patient had surgery 04/30/21. He asked for a call back to discuss the pins. They seem to have changed direction. No pain and they have not been pumped or anything. He just wants to know if it is normal for them to change direction.    Best phone number to contact: [phone]    Best time to contact: any    Ok to leave a detailed message: yes

## 2021-06-17 NOTE — ANESTHESIA PREPROCEDURE EVALUATION
Anesthesia Evaluation      Patient summary reviewed   No history of anesthetic complications     Airway   Mallampati: II   Pulmonary - negative ROS and normal exam                          Cardiovascular - negative ROS and normal exam  Exercise tolerance: > or = 4 METS  (+) , hypercholesterolemia,     Rhythm: regular  Rate: normal,         Neuro/Psych - negative ROS     Endo/Other - negative ROS      GI/Hepatic/Renal    (+) GERD well controlled,        Other findings: Results for KEISHA LOMAX (MRN [id number]) as of 4/30/2021 06:28    4/26/2021 14:03  Hemoglobin: 14.5  Results for KEISHA LOMAX (MRN [id number]) as of 4/30/2021 06:28    4/27/2021 10:33  SARS-CoV-2 Virus Specimen Source: Nasopharyngeal  SARS-CoV-2 PCR Result: NEGATIVE        Dental - normal exam                        Anesthesia Plan  Planned anesthetic: MAC  The patient understands and consents to the risks of MAC anesthesia including nausea/vomiting, dizziness, and conversion to GAETT which include (but not limited to) ETT, hoarse voice, chipped tooth, nausea/vomiting  Versed/fent  propofol ggt  Decadron/zofran  ASA 2     Anesthetic plan and risks discussed with: patient    Post-op plan: routine recovery

## 2021-06-17 NOTE — PATIENT INSTRUCTIONS - HE
Patient Instructions by Juarez Chamberlain MD at 6/17/2019  7:40 AM     Author: Juarez Chamberlain MD Service: -- Author Type: Physician    Filed: 6/17/2019  8:50 AM Encounter Date: 6/17/2019 Status: Signed    : Juarez Chamberlain MD (Physician)       Patient Education     Prevention Guidelines, Men Ages 50 to 64  Screening tests and vaccines are an important part of managing your health. A screening test is done to find possible disorders or diseases in people who don't have any symptoms. The goal is to find a disease early so lifestyle changes can be made and you can be watched more closely to reduce the risk of disease, or to detect it early enough to treat it most effectively. Screening tests are not considered diagnostic, but are used to determine if more testing is needed. Health counseling is essential, too. Below are guidelines for these, for men ages 50 to 64. Talk with your healthcare provider to make sure youre up-to-date on what you need.  Screening Who needs it How often   Alcohol misuse All men in this age group At routine exams   Blood pressure All men in this age group Yearly checkup if your blood pressure is normal  Normal blood pressure is less than 120/80 mm Hg  If your blood pressure reading is higher than normal, follow the advice of your healthcare provider      Colorectal cancer All men in this age group Flexible sigmoidoscopy every 5 years, or colonoscopy every 10 years, or double-contrast barium enema every 5 years; yearly fecal occult blood test or fecal immunochemical test; or a stool DNA test as often as your healthcare provider advises; talk with your healthcare provider about which tests are best for you   Depression All men in this age group At routine exams   Type 2 diabetes or prediabetes All men beginning at age 45 and men without symptoms at any age who are overweight or obese and have 1 or more other risk factors for diabetes At least every 3 years  (yearly if your blood sugar has already begun to rise)   Type 2 diabetes All men with prediabetes Every year   Hepatitis C Men at increased risk for infection - talk with your healthcare provider At routine exams. All men ages 50 to 70 should be tested at least once for hepatitis C.   High cholesterol or triglycerides All men in this age group At least every 5 years   HIV Men at increased risk for infection - talk with your healthcare provider At routine exams   Lung cancer Adults age 55 to 80 who have smoked Yearly screening in smokers with 30 pack-year history of smoking or who quit within 15 years   Obesity All men in this age group At routine exams   Prostate cancer Starting at age 45, talk to healthcare provider about risks and benefits of digital rectal exam (JORDAN) and prostate-specific antigen (PSA) screening1 At routine exams   Syphilis Men at increased risk for infection - talk with your healthcare provider At routine exams   Tuberculosis Men at increased risk for infection - talk with your healthcare provider Ask your healthcare provider   Vision All men in this age group Ask your healthcare provider   Vaccine Who needs it How often   Chickenpox (varicella) All men in this age group who have no record of this infection or vaccine 2 doses; second dose should be given at least 4 weeks after the first dose   Hepatitis A Men at increased risk for infection - talk with your healthcare provider 2 doses given at least 6 months apart   Hepatitis B Men at increased risk for infection - talk with your healthcare provider 3 doses over 6 months; second dose should be given 1 month after the first dose; the third dose should be given at least 2 months after the second dose and at least 4 months after the first dose   Haemophilus influenzae Type B (HIB) Men at increased risk for infection - talk with your healthcare provider 1 to 3 doses   Influenza (flu) All men in this age group Once a year   Measles, mumps, rubella  (MMR) Men in this age group through their late 50s who have no record of these infections or vaccines 1 or 2 doses; ask your healthcare provider   Meningococcal Men at increased risk for infection - talk with your healthcare provider 1 or more doses   Pneumococcal conjugate vaccine (PCV13) and pneumococcal polysaccharide vaccine (PPSV23) Men at increased risk for infection - talk with your healthcare provider PCV13: 1 dose ages 19 to 65 (protects against 13 types of pneumococcal bacteria)     PPSV23: 1 to 2 doses through age 64, or 1 dose at 65 or older (protects against 23 types of pneumococcal bacteria)      Tetanus/diphtheria/  pertussis (Td/Tdap) booster All men in this age group Td every 10 years, or a one-time dose of Tdap instead of a Td booster after age 18, then Td every 10 years   Zoster All men ages 60 and older 1 dose   Counseling Who needs it How often   Diet and exercise Men who are overweight or obese When diagnosed, and then at routine exams   Sexually transmitted infection prevention Men at increased risk for infection - talk with your healthcare provider At routine exams   Use of daily aspirin Men in this age group at risk for cardiovascular health problems At routine exams   Use of tobacco and the health effects it can cause All men in this age group Every visit   [address] [place] [address] [facility]  Date Last Reviewed: 2/1/2017 2000-2017 [employer]. [address] [place] [address], [place] [address]. All rights reserved. This information is not intended as a substitute for professional medical care. Always follow your healthcare professional's instructions.

## 2021-06-17 NOTE — PROGRESS NOTES
Subjective findings: The patient return to the clinic today for postop visit #2, 2 weeks status post hammertoe correction digits 2 and 3 left foot second third metatarsal head resections left foot and extensor tenotomies digits 2 and 3 left foot.  The patient is a good spirits and he had no complaints.     Objective findings: The dressings were removed and the wound margins are well coaptated and maintained.  There is moderate edema noted.  No erythema or cellulitis noted.  Neurovascular status is intact.  Vital signs stable.  Surgical correction maintained.     Assessment: Hammertoe, plantarflexed metatarsals, contracted extensor tendons.     Plan: All sutures removed today.  The patient was instructed to return to the clinic in 1 week for postop visit #3 at which time the K wire from the second toe will be removed.

## 2021-06-17 NOTE — TELEPHONE ENCOUNTER
Pt called with questions regarding aftercare after his surgery completion today with Dr. Neri. Please call pt to discuss his questions.

## 2021-06-17 NOTE — TELEPHONE ENCOUNTER
"Pt wondering if he is supposed to use ice. Informed can use ice 15 min at a time to help decrease swelling and pain.     Pt states has a small spot of blood coming through outer part of dressing, \"doesn't look like it is actively bleeding\"    Pt states is walking on foot \"maybe 10% of the time\"     Informed to stay off foot more and to elevate.     If notices bleeding wont stop to go to ER.     Pt states will take the rest of the day easier. No further questions  "

## 2021-06-18 NOTE — PATIENT INSTRUCTIONS - HE
Patient Instructions by Juarez Chamberlain MD at 4/26/2021  2:00 PM     Author: Juarez Chamberlain MD Service: -- Author Type: Physician    Filed: 4/28/2021  1:22 PM Encounter Date: 4/26/2021 Status: Signed    : Juarez Chamberlain MD (Physician)           Patient Education     Foot Surgery: Flexible and Rigid Hammertoes  With hammertoes, one or more toes curl or bend abnormally. This can be caused by an inherited muscle problem, an abnormal bone length, or poor foot mechanics. The affected joints can rub inside shoes. This causes buildups of dead skin called corns.  There are many nonsurgical treatments for hammertoes. But if these are not effective, you may want to consider surgery.    Flexible hammertoes  When hammertoes are flexible, you can straighten the buckled joints. Flexible hammertoes may become rigid over time.    Tendon release  This treatment helps release the buckled joint. The bottom (flexor) tendon may be repositioned to the top of the affected toe (flexor tendon transfer). Sometimes, the top or bottom tendon is released but not repositioned (tenotomy).    Rigid hammertoes  Rigid hammertoes are fixed (not flexible). You can't straighten the buckled joints. Corns, pain, and loss of function may be more severe with rigid hammertoes than with flexible ones.    Arthroplasty  A part of the joint is removed, and the toe is straightened. In some cases, the entire joint may be replaced with an implant. When healed, the bones become connected with scar tissue, making your toe flexible.    Fusion  First the cartilage and some bone on both sides of the joint are removed. Then the toe is straightened and the two bones are held together, often with a pin. The pin is removed after several weeks. Once your foot heals, the toe will be less flexible. But it will be more stable.  Healing after surgery  The severity of your condition, number of toes involved, and type of surgery done will  affect your recovery time. Many people are able to walk right after surgery with a special surgical shoe. Full healing can take several weeks. Your healthcare provider can advise you on what to expect after surgery.  Date Last Reviewed: 5/1/2018 2000-2019 [employer]. [address] [place] [address]. All rights reserved. This information is not intended as a substitute for professional medical care. Always follow your healthcare professional's instructions.

## 2021-06-18 NOTE — PATIENT INSTRUCTIONS - HE
Patient Instructions by Juarez Chamberlain MD at 9/8/2020  9:00 AM     Author: Juarez Chamberlain MD Service: -- Author Type: Physician    Filed: 9/8/2020  9:46 AM Encounter Date: 9/8/2020 Status: Signed    : Juarez Chamberlain MD (Physician)       Patient Education     Prevention Guidelines, Men Ages 50 to 64  Screening tests and vaccines are an important part of managing your health. A screening test is done to find diseases in people who don't have any symptoms. The goal is to find a disease early so lifestyle changes and checkups can reduce the risk of disease. Or the goal may be to detect it early to treat it most effectively. Screening tests are not used to diagnose a disease. But they are used to see if more testing is needed. Health counseling is important, too. Below are guidelines for these, for men ages 50 to 64. Talk with your healthcare provider to make sure youre up-to-date on what you need.  Screening Who needs it How often   Alcohol misuse All men in this age group At routine exams   Blood pressure All men in this age group Yearly checkup if your blood pressure is normal  Normal blood pressure is less than 120/80 mm Hg  If your blood pressure reading is higher than normal, follow the advice of your healthcare provider   Colorectal cancer All men at average risk in this age group Multiple tests are available and are used at different times. Possible tests include:     Flexible sigmoidoscopy every 5 years, or    Colonoscopy every 10 years, or    CT colonography (virtual colonoscopy) every 5 years, or    Yearly fecal occult blood test, or    Yearly fecal immunochemical test every year, or    Stool DNA test, every 3 years  If you choose a test other than a colonoscopy and have an abnormal test result, you will need to follow-up with a colonoscopy. Screening recommendations advice vary varies among expert groups. Talk with your healthcare provider about which tests are best for  you.  Some people should be screened using a different schedule because of their personal or family health history. Talk with your healthcare provider about your health history.   Depression All men in this age group At routine exams   Type 2 diabetes or prediabetes All men beginning at age 45 and men without symptoms at any age who are overweight or obese and have 1 or more other risk factors for diabetes At least every 3 years (yearly if your blood sugar has already begun to rise)   Type 2 diabetes All men with prediabetes Every year   Hepatitis C Men at increased risk for infection - talk with your healthcare provider At routine exams. All men ages 50 to 70 should be tested at least once for hepatitis C.   High cholesterol or triglycerides All men in this age group At least every 5 years   HIV Men at increased risk for infection - talk with your healthcare provider At routine exams   Lung cancer Adults age 55 to 80 who have smoked Yearly screening in smokers with 30 pack-year history of smoking or who quit within 15 years   Obesity All men in this age group At routine exams   BMI (body mass index) All men in this age group3 Every year, to help find out if you are at a healthy weight for your height   Prostate cancer Starting at age 45, talk to healthcare provider about risks and benefits of digital rectal exam (JORDAN) and prostate-specific antigen (PSA) screening1 At routine exams   Syphilis Men at increased risk for infection - talk with your healthcare provider At routine exams   Tuberculosis Men at increased risk for infection - talk with your healthcare provider Ask your healthcare provider   Vision All men in this age group (2)       Men ages 40 to 54: every 2 to 4 years if no risk factors for eye disease (2)    Men ages 55 to 64: every 1 to 3 years if no risk factors for eye disease (2)  Ask your healthcare provider if you need glaucoma screening with a dilated eye exam every 2 years(2).    Vaccine Who needs  it How often   Chickenpox (varicella) All men in this age group who have no record of this infection or vaccine 2 doses; second dose should be given at least 4 weeks after the first dose   Hepatitis A Men at increased risk for infection - talk with your healthcare provider 2 or 3 doses (depending on the vaccine) given at least 6 months apart; check with your healthcare provider   Hepatitis B Men at increased risk for infection - talk with your healthcare provider 2 or 3 doses (depending on the vaccine) over 6 months;check with your healthcare provider. The second dose should be given 1 month after the first dose; the third dose should be given at least 2 months after the second dose and at least 4 months after the first dose.   Haemophilus influenzae Type B (HIB) Men at increased risk for infection - talk with your healthcare provider 1 to 3 doses   Influenza (flu) All men in this age group Once a year   Measles, mumps, rubella (MMR) Men in this age group through their late 50s who have no record of these infections or vaccines 1 or 2 doses; ask your healthcare provider   Meningococcal ACWY (MenACWY) Men at increased risk for infection - talk with your healthcare provider 1 or more doses depending on your case. Then a booster every 5 years if you are still at risk. Check with your healthcare provider.   Meningococcal B (MenB) Men at increased risk for infection - talk with your healthcare provider 2 or more doses, depending on the vaccine and your case; check with your healthcare provider   Pneumococcal conjugate vaccine (PCV13) and pneumococcal polysaccharide vaccine (PPSV23) Men at increased risk for infection - talk with your healthcare provider PCV13: 1 dose ages 19 to 65 (protects against 13 types of pneumococcal bacteria)  PPSV23: 1 to 2 doses through age 64, or 1 dose at 65 or older (protects against 23 types of pneumococcal bacteria)   Tetanus/diphtheria/pertussis (Td/Tdap) booster All men in this age group  Td every 10 years, or a 1-time dose of Tdap instead of a Td booster after age 18, then Td every 10 years   Zoster recombinant (RZV) All men ages 50 and older 2 doses given 2 to 6 months apart   Zoster live (ZVL) All men ages 60 and older 1 dose   Counseling Who needs it How often   Diet and exercise Men who are overweight or obese When diagnosed, and then at routine exams   Sexually transmitted infection prevention Men at increased risk for infection - talk with your healthcare provider At routine exams   Daily low-dose aspirin Men ages 40 to 70 years who are not at increased risk of bleeding and are at high risk of cardiovascular disease 4; talk with your healthcare provider At routine exams   Use of statins Men ages 20 to 75 years who have an LDL-C level of more than 190 mg/dL (4) At routine exams, or more often as directed by your healthcare provider. Statin dosages may vary based on your overall health, risk factors, and other health conditions such as diabetes. Check with your healthcare provider. (4)   Use of tobacco and the health effects it can cause All men in this age group Every visit   1 National Comprehensive Cancer Network  2 American Academy of Ophthalmology  3 American Cancer Society  4 American College of Cardiology  Date Last Reviewed: 5/1/2019 2000-2019 [employer]. [address] [place] [address], [place] [address]. All rights reserved. This information is not intended as a substitute for professional medical care. Always follow your healthcare professional's instructions.

## 2021-06-18 NOTE — PATIENT INSTRUCTIONS - HE
Patient Instructions by oCleen Edge CMA at 2/5/2020  9:40 AM     Author: Coleen Edge CMA Service: -- Author Type: Certified Medical Assistant    Filed: 2/5/2020  9:52 AM Encounter Date: 2/5/2020 Status: Addendum    : Coleen Edge CMA (Certified Medical Assistant)    Related Notes: Original Note by Coleen Edge CMA (Certified Medical Assistant) filed at 2/5/2020  9:44 AM       Understanding Plantar Warts    A plantar wart is a small, noncancerous growth on the bottom of the foot. Plantar warts often develop where friction or pressure occurs, such as on the ball of the foot. The word plantar refers to the sole of the foot. Similar warts can occur on other areas of the body such as the hands. Plantar warts are more common in children and young adults.  What causes a plantar wart?  Plantar warts are caused by a virus called human papillomavirus (HPV).  They can be spread by person-to-person contact. Or you can develop one if you walk barefoot on moist surfaces infected with the virus. This might be in a community pool area or locker room. Wearing correct footwear in such places can prevent them.  What are the symptoms of plantar warts?  Plantar warts cause a thick, rough, and often raised patch of skin on the bottom of the foot. The wart may have black dots on it. These dots are dried blood. The wart may cause pain or discomfort. You may also have trouble walking because of the pain.  How are plantar warts treated?  Many plantar warts go away without any treatment. But for those that are painful or that dont go away, several treatments are available. These include:    Cantharidin. This treatment is put directly on the wart. It may come in the form of a liquid. Stronger than over the counter its prescription medication.       Cryotherapy. Your healthcare provider puts liquid nitrogen on the wart with a cotton swab or spray. This treatment might be painful.      Please call one of the [facility] [facility]  locations below to schedule an appointment. If you received a prescription please bring it with you to your appointment. Some locations are limited to what they carry.    Office Locations    [facility]  [address] [place] [address]  Home Medical Equipment, [address]   Phone: [phone]   Orthotics and Prosthetics, [address]   Phone: [phone]    [facility]  Home Medical Equipment  [address], [address], [place] [address]   Phone: [phone]    [facility] ([facility])    [address], [place] [address]  Phone: [phone]    [facility]  [address]  [address]   [place] [address]   Phone: [phone]    [facility]  [address]  [place] [address]  Phone: [phone]    [facility]   [address]  [place] [address]  Phone: [phone]    [facility]  [address]  [place] [address]  Phone: [phone]    [facility]  [address]  [address]  [place] [address]  Phone: [phone]    [place]   [address] [place] [address]   Phone: [phone]

## 2021-06-25 NOTE — PROGRESS NOTES
Subjective findings: The patient return to the clinic today for postop visit #3, 3 weeks status post hammertoe correction digits 2 and 3 left foot second third metatarsal head resections left foot and extensor tenotomies digits 2 and 3 left foot.  The patient is a good spirits and he had no complaints.     Objective findings: The dressings were removed and the wound margins are well coaptated and maintained.  There is moderate edema noted.  No erythema or cellulitis noted.  Neurovascular status is intact.  Vital signs stable.  Surgical correction maintained.     Assessment: Hammertoe, plantarflexed metatarsals, contracted extensor tendons.     Plan:  All K wires were removed today.  An x-ray of the left foot was taken today.  The x-rays were read and interpreted by this physician.  The patient was instructed to gradually return to normal activities.  He is to keep the foot dry for an additional week.  He is to return to the clinic as needed.

## 2021-06-28 NOTE — PROGRESS NOTES
Progress Notes by Freddie Angel MD at 2/10/2020  9:15 AM     Author: Freddie Angel MD Service: -- Author Type: Physician    Filed: 2/10/2020  4:34 PM Encounter Date: 2/10/2020 Status: Signed    : Freddie Angel MD (Physician)              [facility]    Comprehensive and complex medical care - Chronic disease management - Shared decision making - Care coordination - Compassionate care    Patient advocacy - Rational deprescribing - Minimally disruptive medicine - Ethical focus - Customized care          Date of Service: 2/10/2020  Primary Provider: Juarez Chamberlain MD    Patient Care Team:  Juarez Chamberlain MD as PCP - General (Family Medicine)  Juarez Chamberlain MD as Assigned PCP     ______________________________________________________________________     Patient's Pharmacy:    [id number] - [place], [place] - [address] [place] [address]  Phone: [phone] Fax: [phone]     Patient's Contacts:  Name Home Phone Work Phone Mobile Phone Relationship Lgl EMILY El [phone]   Spouse    DECLINED, PER PAT*    Declined      Patient's Insurance:    Payor: [employer] / Plan: [employer] / Product Type: PPO/POS/FFS /           Juan Carlos Helton is a 64 y.o. male who comes in today for:    Chief Complaint   Patient presents with   ? Tingling     2/8/20, TINGLING IN LEFT HAND AND HAS BEEN PAINFUL, COMES AND GOES       Active Problem List:  Problem List as of 2/10/2020 Reviewed: 2/10/2020  4:20 PM by Freddie Angel MD       Unprioritized    Arthritis    Gastroesophageal reflux disease without esophagitis    High cholesterol           Current Outpatient Medications   Medication Sig   ? ASCORBIC ACID, VITAMIN C, ORAL Take by mouth.   ? cholecalciferol, vitamin D3, (VITAMIN D3) 2,000 unit capsule    ? DICLOFENAC SODIUM ORAL Take by mouth as needed.          ? green tea leaf  extract (GREEN TEA) cap daily   ? ibuprofen (ADVIL) 200 MG tablet Take by mouth.   ? loratadine (CLARITIN) 10 mg tablet Take by mouth.   ? MULTIVITAMIN ORAL Take by mouth.   ? omega-3 fatty acids (FISH OIL CONCENTRATE ORAL) Take by mouth.   ? omeprazole (PRILOSEC) 20 MG capsule TAKE 1 CAP BY MOUTH ONCE DAILY.   ? predniSONE (DELTASONE) 10 mg tablet Take 20 mg by mouth daily for 7 days, THEN 10 mg daily for 7 days.     Social History     Social History Narrative   ? Not on file       Subjective:     Patient typically sees Dr. Chamberlain for his care.    We first reviewed some numbness which has been rather acute to his left hand.  It has come on over the last 2 days.  It started 2 nights ago.  He woke up with it.  He has hand numbness especially in the thumb and also the 3 middle fingers.  The numbness and pain with this can go up his forearm to his elbow and may be up into the upper arm a little bit.  He denies any numbness or pain radiating down from his neck or shoulder blade.  He has noticed a little bit of weakness with it.  He took off work today because it has been worse over the last couple of days.    He had some neck pain about 2 weeks ago when waking up, but did not have any radiating pain with this.  This has subsequently resolved after 1 to 2 days.  He denies any fevers, or chills, or night sweats.  He has had no neck pain in the past.    He denies any problems with the right hand and denies any diffuse arthralgias.    He describes the pain in his arm as throbbing.  He denies any aggravating factors or alleviating factors.    He does have a swelling over the left wrist that he has not really noticed before.  He denies any warmth with this or direct pain related to this.    His wife is a cardiac rehab specialist.    We reviewed his other issues noted in the assessment but not specifically addressed in the HPI above.     On review of systems, the patient denies any chest pain or shortness of  breath.    Objective:     Wt Readings from Last 3 Encounters:   02/10/20 202 lb (91.6 kg)   12/09/19 198 lb 12 oz (90.2 kg)   10/07/19 199 lb 4 oz (90.4 kg)     BP Readings from Last 3 Encounters:   02/10/20 134/82   02/05/20 120/72   12/09/19 115/80     /82   Pulse 65   Temp 97.7  F (36.5  C) (Oral)   Wt 202 lb (91.6 kg)   SpO2 96%   BMI 28.17 kg/m     The patient is comfortable, no acute distress.  Mood good.  Insight good.  Eyes are nonicteric.  Neck is supple without mass.  No cervical adenopathy.  No thyromegaly. Heart regular rate and rhythm.  Lungs clear to auscultation bilaterally.  Respiratory effort is good.  Extremities no edema.  Neck range of motion is good.  Reflexes in the upper extremities is full.  The strength in the upper extremities is good.  There is no wasting of the thumb muscles.  There is a swelling over the volar wrist right in front of the carpal tunnel which is about 2 cm or slightly larger in size.  Pressing on the cyst exacerbates his symptoms especially in the thumb and fingers.      Diagnostics:     Results for orders placed or performed in visit on 06/17/19   Glycosylated Hemoglobin A1c   Result Value Ref Range    Hemoglobin A1c 5.4 3.5 - 6.0 %   Comprehensive Metabolic Panel   Result Value Ref Range    Sodium 138 136 - 145 mmol/L    Potassium 4.4 3.5 - 5.0 mmol/L    Chloride 104 98 - 107 mmol/L    CO2 25 22 - 31 mmol/L    Anion Gap, Calculation 9 5 - 18 mmol/L    Glucose 91 70 - 125 mg/dL    BUN 12 8 - 22 mg/dL    Creatinine 0.81 0.70 - 1.30 mg/dL    GFR MDRD Af Amer >60 >60 mL/min/1.73m2    GFR MDRD Non Af Amer >60 >60 mL/min/1.73m2    Bilirubin, Total 0.7 0.0 - 1.0 mg/dL    Calcium 9.9 8.5 - 10.5 mg/dL    Protein, Total 7.3 6.0 - 8.0 g/dL    Albumin 4.0 3.5 - 5.0 g/dL    Alkaline Phosphatase 55 45 - 120 U/L    AST 28 0 - 40 U/L    ALT 21 0 - 45 U/L   Lipid Cascade   Result Value Ref Range    Cholesterol 234 (H) <=199 mg/dL    Triglycerides 187 (H) <=149 mg/dL    HDL  Cholesterol 49 >=40 mg/dL    LDL Calculated 148 (H) <=129 mg/dL    Patient Fasting > 8hrs? Yes    PSA, Annual Screen (Prostatic-Specific Antigen)   Result Value Ref Range    PSA 1.1 0.0 - 4.5 ng/mL   HM1 (CBC with Diff)   Result Value Ref Range    WBC 5.3 4.0 - 11.0 thou/uL    RBC 4.77 4.40 - 6.20 mill/uL    Hemoglobin 15.2 14.0 - 18.0 g/dL    Hematocrit 44.0 40.0 - 54.0 %    MCV 92 80 - 100 fL    MCH 31.8 27.0 - 34.0 pg    MCHC 34.5 32.0 - 36.0 g/dL    RDW 12.1 11.0 - 14.5 %    Platelets 207 140 - 440 thou/uL    MPV 7.4 7.0 - 10.0 fL    Neutrophils % 68 50 - 70 %    Lymphocytes % 21 20 - 40 %    Monocytes % 8 2 - 10 %    Eosinophils % 2 0 - 6 %    Basophils % 1 0 - 2 %    Neutrophils Absolute 3.6 2.0 - 7.7 thou/uL    Lymphocytes Absolute 1.1 0.8 - 4.4 thou/uL    Monocytes Absolute 0.4 0.0 - 0.9 thou/uL    Eosinophils Absolute 0.1 0.0 - 0.4 thou/uL    Basophils Absolute 0.0 0.0 - 0.2 thou/uL       Assessment:     1. Ganglion cyst of wrist, left    2. Carpal tunnel syndrome of left wrist    3. Numbness and tingling in left hand    4. Neck pain        Quality review:     PHQ-2 Total Score: 0 (6/17/2019  7:41 AM)    No data recorded  ______________________________________________________________________     BMI Readings from Last 1 Encounters:   02/10/20 28.17 kg/m        Plan:     1. I wonder whether the ganglion cyst is causing some acute symptoms with a carpal tunnel.  2. He wanted a referral to hand specialty and this was provided today.  3. I do not believe the neck pain is causing the hand and lower arm numbness.  Further work-up for this could be pursued if necessary.  4. We gave him a low dose of prednisone 20 mg for 1 week and then 10 mg for 1 week to see if this helps with his symptoms.  5. A carpal tunnel brace was given.  6. He should follow-up with his primary provider if he still has issues.         Freddie Angel MD  General Internal Medicine  [facility]    Personal office  fax - [phone]   Voice mail - [phone]  E-mail - [email or web address]     Return in about 4 months (around 6/10/2020) for physical, follow up with your primary care provider.     No future appointments.      ______________________________________________________________________     Relevant ICD-10 codes and order associations:      ICD-10-CM    1. Ganglion cyst of wrist, left M67.432 Ambulatory referral to Orthopedics   2. Carpal tunnel syndrome of left wrist G56.02 Ambulatory referral to Orthopedics     Wrist brace     predniSONE (DELTASONE) 10 mg tablet   3. Numbness and tingling in left hand R20.0     R20.2    4. Neck pain M54.2

## 2021-07-03 NOTE — ADDENDUM NOTE
Addendum Note by Juarez Chamberlain MD at 9/8/2020  9:00 AM     Author: Juarez Chamberlain MD Service: -- Author Type: Physician    Filed: 9/8/2020  2:31 PM Encounter Date: 9/8/2020 Status: Signed    : Juarez Chamberlain MD (Physician)    Addended by: JUAREZ CHAMBERLAIN on: 9/8/2020 02:31 PM        Modules accepted: Orders

## 2021-09-09 ENCOUNTER — OFFICE VISIT (OUTPATIENT)
Dept: FAMILY MEDICINE | Facility: CLINIC | Age: 66
End: 2021-09-09
Payer: COMMERCIAL

## 2021-09-09 VITALS
HEART RATE: 68 BPM | DIASTOLIC BLOOD PRESSURE: 85 MMHG | TEMPERATURE: 96.8 F | WEIGHT: 188 LBS | SYSTOLIC BLOOD PRESSURE: 133 MMHG | BODY MASS INDEX: 26.92 KG/M2 | HEIGHT: 70 IN

## 2021-09-09 DIAGNOSIS — Z13.0 SCREENING FOR ENDOCRINE, NUTRITIONAL, METABOLIC AND IMMUNITY DISORDER: ICD-10-CM

## 2021-09-09 DIAGNOSIS — Z13.29 SCREENING FOR ENDOCRINE, NUTRITIONAL, METABOLIC AND IMMUNITY DISORDER: ICD-10-CM

## 2021-09-09 DIAGNOSIS — Z00.00 ROUTINE GENERAL MEDICAL EXAMINATION AT A HEALTH CARE FACILITY: Primary | ICD-10-CM

## 2021-09-09 DIAGNOSIS — Z13.228 SCREENING FOR ENDOCRINE, NUTRITIONAL, METABOLIC AND IMMUNITY DISORDER: ICD-10-CM

## 2021-09-09 DIAGNOSIS — Z13.21 SCREENING FOR ENDOCRINE, NUTRITIONAL, METABOLIC AND IMMUNITY DISORDER: ICD-10-CM

## 2021-09-09 DIAGNOSIS — Z12.5 SCREENING FOR PROSTATE CANCER: ICD-10-CM

## 2021-09-09 LAB
ALBUMIN SERPL-MCNC: 3.9 G/DL (ref 3.5–5)
ALP SERPL-CCNC: 56 U/L (ref 45–120)
ALT SERPL W P-5'-P-CCNC: 17 U/L (ref 0–45)
ANION GAP SERPL CALCULATED.3IONS-SCNC: 12 MMOL/L (ref 5–18)
AST SERPL W P-5'-P-CCNC: 30 U/L (ref 0–40)
BILIRUB SERPL-MCNC: 0.9 MG/DL (ref 0–1)
BUN SERPL-MCNC: 17 MG/DL (ref 8–22)
CALCIUM SERPL-MCNC: 9.5 MG/DL (ref 8.5–10.5)
CHLORIDE BLD-SCNC: 100 MMOL/L (ref 98–107)
CHOLEST SERPL-MCNC: 208 MG/DL
CO2 SERPL-SCNC: 23 MMOL/L (ref 22–31)
CREAT SERPL-MCNC: 0.91 MG/DL (ref 0.7–1.3)
GFR SERPL CREATININE-BSD FRML MDRD: 89 ML/MIN/1.73M2
GLUCOSE BLD-MCNC: 65 MG/DL (ref 70–125)
HBA1C MFR BLD: 5 % (ref 0–5.6)
HDLC SERPL-MCNC: 64 MG/DL
LDLC SERPL CALC-MCNC: 124 MG/DL
POTASSIUM BLD-SCNC: 4.3 MMOL/L (ref 3.5–5)
PROT SERPL-MCNC: 7.1 G/DL (ref 6–8)
PSA SERPL-MCNC: 0.81 UG/L (ref 0–4.5)
SODIUM SERPL-SCNC: 135 MMOL/L (ref 136–145)
TRIGL SERPL-MCNC: 101 MG/DL

## 2021-09-09 PROCEDURE — G0103 PSA SCREENING: HCPCS | Performed by: FAMILY MEDICINE

## 2021-09-09 PROCEDURE — 83036 HEMOGLOBIN GLYCOSYLATED A1C: CPT | Performed by: FAMILY MEDICINE

## 2021-09-09 PROCEDURE — 80053 COMPREHEN METABOLIC PANEL: CPT | Performed by: FAMILY MEDICINE

## 2021-09-09 PROCEDURE — 80061 LIPID PANEL: CPT | Performed by: FAMILY MEDICINE

## 2021-09-09 PROCEDURE — 36415 COLL VENOUS BLD VENIPUNCTURE: CPT | Performed by: FAMILY MEDICINE

## 2021-09-09 PROCEDURE — 99396 PREV VISIT EST AGE 40-64: CPT | Performed by: FAMILY MEDICINE

## 2021-09-09 RX ORDER — HYDROCODONE BITARTRATE AND ACETAMINOPHEN 5; 325 MG/1; MG/1
1 TABLET ORAL
COMMUNITY
Start: 2021-04-30 | End: 2021-09-09

## 2021-09-09 RX ORDER — CEPHALEXIN 500 MG/1
CAPSULE ORAL
COMMUNITY
Start: 2021-04-30 | End: 2021-09-09

## 2021-09-09 RX ORDER — FEXOFENADINE HCL 180 MG/1
180 TABLET ORAL
COMMUNITY
End: 2023-02-03

## 2021-09-09 RX ORDER — HYDROCODONE BITARTRATE AND ACETAMINOPHEN 5; 325 MG/1; MG/1
TABLET ORAL
COMMUNITY
Start: 2021-04-30 | End: 2021-09-09

## 2021-09-09 RX ORDER — BETAMETHASONE DIPROPIONATE 0.5 MG/G
CREAM TOPICAL
COMMUNITY
Start: 2021-02-23 | End: 2021-09-09

## 2021-09-09 RX ORDER — DICLOFENAC SODIUM 75 MG/1
TABLET, DELAYED RELEASE ORAL
COMMUNITY
Start: 2021-02-26 | End: 2021-09-09

## 2021-09-09 RX ORDER — GINKGO BILOBA LEAF EXTRACT 60 MG
CAPSULE ORAL
COMMUNITY

## 2021-09-09 RX ORDER — GINKGO BILOBA LEAF EXTRACT 60 MG
CAPSULE ORAL
COMMUNITY
End: 2022-01-19

## 2021-09-09 ASSESSMENT — MIFFLIN-ST. JEOR: SCORE: 1649.01

## 2021-09-09 NOTE — PROGRESS NOTES
SUBJECTIVE:   CC: Juan Carlos Helton is an 66 year old male who presents for preventative health visit.       Patient has been advised of split billing requirements and indicates understanding: Yes            Today's PHQ-2 Score:   PHQ-2 ( 1999 [employer]) 9/9/2021   Q1: Little interest or pleasure in doing things 0   Q2: Feeling down, depressed or hopeless 0   PHQ-2 Score 0   Q1: Little interest or pleasure in doing things -   Q2: Feeling down, depressed or hopeless -   PHQ-2 Score -       Abuse: Current or Past(Physical, Sexual or Emotional)- No  Do you feel safe in your environment? Yes        Social History     Tobacco Use     Smoking status: Never Smoker     Smokeless tobacco: Never Used   Substance Use Topics     Alcohol use: Yes     If you drink alcohol do you typically have >3 drinks per day or >7 drinks per week? No      Last PSA:   Prostate Specific Antigen Screen   Date Value Ref Range Status   09/09/2021 0.81 0.00 - 4.50 ug/L Final       Reviewed orders with patient. Reviewed health maintenance and updated orders accordingly - Yes  Labs reviewed in EPIC    Reviewed and updated as needed this visit by clinical staff  Tobacco  Allergies  Meds              Reviewed and updated as needed this visit by Provider    Meds             Past Medical History:   Diagnosis Date     Arthritis      High cholesterol       Past Surgical History:   Procedure Laterality Date     APPENDECTOMY       ARTHRODESIS ANKLE Right     S/P subtalar arthrodesis, PTT synoverctomy, 2 MT osteotomy, IP joint hemiresection, EDL/gastroc lengthening 8/4/2011     INGUINAL HERNIA REPAIR       REPAIR HAMMER TOE Left 4/30/2021    Procedure: CORRECTION, HAMMER TOE, left second and third toe, EXCISION, METATARSAL BONE, HEAD second and third left toes, RELEASE, TENDON, EXTENSOR second and third toe left foot;  Surgeon: Perico Neri DPM;  Location: [facility];  Service: Podiatry     REPAIR HAMMER TOE Left 4/30/2021    Procedure: RELEASE,  "TENDON, EXTENSOR second and third toe left foot;  Surgeon: Perico Neri DPM;  Location: [facility];  Service: Podiatry       Review of Systems  CONSTITUTIONAL: NEGATIVE for fever, chills, change in weight  INTEGUMENTARY/SKIN: NEGATIVE for worrisome rashes, moles or lesions  EYES: NEGATIVE for vision changes or irritation  ENT: NEGATIVE for ear, mouth and throat problems  RESP: NEGATIVE for significant cough or SOB  CV: NEGATIVE for chest pain, palpitations or peripheral edema  GI: NEGATIVE for nausea, abdominal pain, heartburn, or change in bowel habits   male: negative for dysuria, hematuria, decreased urinary stream, erectile dysfunction, urethral discharge  MUSCULOSKELETAL: NEGATIVE for significant arthralgias or myalgia  NEURO: NEGATIVE for weakness, dizziness or paresthesias  PSYCHIATRIC: NEGATIVE for changes in mood or affect    OBJECTIVE:   /85   Pulse 68   Temp 96.8  F (36  C) (Temporal)   Ht 1.778 m (5' 10\")   Wt 85.3 kg (188 lb)   BMI 26.98 kg/m      Physical Exam  GENERAL: healthy, alert and no distress  EYES: Eyes grossly normal to inspection, PERRL and conjunctivae and sclerae normal  HENT: ear canals and TM's normal, nose and mouth without ulcers or lesions  NECK: no adenopathy, no asymmetry, masses, or scars and thyroid normal to palpation  RESP: lungs clear to auscultation - no rales, rhonchi or wheezes  CV: regular rate and rhythm, normal S1 S2, no S3 or S4, no murmur, click or rub, no peripheral edema and peripheral pulses strong  ABDOMEN: soft, nontender, no hepatosplenomegaly, no masses and bowel sounds normal  MS: no gross musculoskeletal defects noted, no edema  SKIN: no suspicious lesions or rashes  NEURO: Normal strength and tone, mentation intact and speech normal  PSYCH: mentation appears normal, affect normal/bright    Diagnostic Test Results:  Labs reviewed in Epic    ASSESSMENT/PLAN:   (Z00.00) Routine general medical examination at a health care facility  " "(primary encounter diagnosis)  Comment:   Plan:     (Z13.29,  Z13.21,  Z13.228,  Z13.0) Screening for endocrine, nutritional, metabolic and immunity disorder  Comment:   Plan: **Comprehensive metabolic panel FUTURE 14d,         **A1C FUTURE 3mo, Lipid panel reflex to direct         LDL Fasting            (Z12.5) Screening for prostate cancer  Comment:   Plan: PSA, screen          Mild GERD, continue with as needed omeprazole alternating with Pepcid.  Toe deformity, improved with surgery, has noticed less calluses.    Patient has been advised of split billing requirements and indicates understanding: No  COUNSELING:   Reviewed preventive health counseling, as reflected in patient instructions       Regular exercise       Healthy diet/nutrition       Vision screening       Hearing screening       Advance Care Planning    Estimated body mass index is 26.98 kg/m  as calculated from the following:    Height as of this encounter: 1.778 m (5' 10\").    Weight as of this encounter: 85.3 kg (188 lb).     Weight management plan: Discussed healthy diet and exercise guidelines    He reports that he has never smoked. He has never used smokeless tobacco.      Counseling Resources:  ATP IV Guidelines  Pooled Cohorts Equation Calculator  FRAX Risk Assessment  ICSI Preventive Guidelines  Dietary Guidelines for Americans, 2010  [employer]'s MyPlate  ASA Prophylaxis  Lung CA Screening    Juarez Chamberlain MD  [facility]  Answers for HPI/ROS submitted by the patient on 9/2/2021  Frequency of exercise:: 6-7 days/week  Getting at least 3 servings of Calcium per day:: Yes  Diet:: Regular (no restrictions)  Taking medications regularly:: Yes  Medication side effects:: None  Bi-annual eye exam:: Yes  Dental care twice a year:: Yes  Sleep apnea or symptoms of sleep apnea:: None  Additional concerns today:: No  Duration of exercise:: Greater than 60 minutes      "

## 2021-09-19 ENCOUNTER — HEALTH MAINTENANCE LETTER (OUTPATIENT)
Age: 66
End: 2021-09-19

## 2021-11-01 ENCOUNTER — IMMUNIZATION (OUTPATIENT)
Dept: NURSING | Facility: CLINIC | Age: 66
End: 2021-11-01
Payer: COMMERCIAL

## 2021-11-01 PROCEDURE — 0004A PR COVID VAC PFIZER DIL RECON 30 MCG/0.3 ML IM: CPT

## 2021-11-01 PROCEDURE — 91300 PR COVID VAC PFIZER DIL RECON 30 MCG/0.3 ML IM: CPT

## 2022-01-12 VITALS — BODY MASS INDEX: 24.11 KG/M2 | WEIGHT: 178 LBS | HEIGHT: 72 IN

## 2022-01-18 VITALS
OXYGEN SATURATION: 96 % | SYSTOLIC BLOOD PRESSURE: 125 MMHG | HEART RATE: 61 BPM | BODY MASS INDEX: 28.17 KG/M2 | WEIGHT: 202 LBS | DIASTOLIC BLOOD PRESSURE: 82 MMHG | HEART RATE: 65 BPM | HEIGHT: 71 IN | BODY MASS INDEX: 28.42 KG/M2 | OXYGEN SATURATION: 97 % | WEIGHT: 203 LBS | DIASTOLIC BLOOD PRESSURE: 70 MMHG | TEMPERATURE: 98.1 F | SYSTOLIC BLOOD PRESSURE: 134 MMHG | TEMPERATURE: 97.7 F

## 2022-01-18 VITALS
HEIGHT: 72 IN | BODY MASS INDEX: 24.11 KG/M2 | HEART RATE: 63 BPM | WEIGHT: 178 LBS | OXYGEN SATURATION: 98 % | DIASTOLIC BLOOD PRESSURE: 84 MMHG | SYSTOLIC BLOOD PRESSURE: 120 MMHG

## 2022-01-18 VITALS
BODY MASS INDEX: 24.11 KG/M2 | DIASTOLIC BLOOD PRESSURE: 88 MMHG | HEART RATE: 65 BPM | OXYGEN SATURATION: 98 % | SYSTOLIC BLOOD PRESSURE: 130 MMHG | HEIGHT: 72 IN | WEIGHT: 178 LBS

## 2022-01-18 VITALS
BODY MASS INDEX: 23.98 KG/M2 | SYSTOLIC BLOOD PRESSURE: 118 MMHG | WEIGHT: 177 LBS | HEIGHT: 72 IN | DIASTOLIC BLOOD PRESSURE: 84 MMHG | HEART RATE: 62 BPM | OXYGEN SATURATION: 97 %

## 2022-01-18 VITALS
TEMPERATURE: 98.7 F | HEART RATE: 68 BPM | SYSTOLIC BLOOD PRESSURE: 132 MMHG | BODY MASS INDEX: 27.4 KG/M2 | DIASTOLIC BLOOD PRESSURE: 90 MMHG | HEIGHT: 72 IN

## 2022-01-18 VITALS — WEIGHT: 203 LBS | HEART RATE: 67 BPM | BODY MASS INDEX: 28.42 KG/M2 | OXYGEN SATURATION: 98 % | HEIGHT: 71 IN

## 2022-01-18 VITALS
SYSTOLIC BLOOD PRESSURE: 125 MMHG | BODY MASS INDEX: 25.94 KG/M2 | OXYGEN SATURATION: 99 % | RESPIRATION RATE: 12 BRPM | WEIGHT: 186 LBS | DIASTOLIC BLOOD PRESSURE: 75 MMHG | HEART RATE: 59 BPM

## 2022-01-18 VITALS — HEART RATE: 62 BPM | SYSTOLIC BLOOD PRESSURE: 120 MMHG | DIASTOLIC BLOOD PRESSURE: 72 MMHG

## 2022-01-19 ENCOUNTER — OFFICE VISIT (OUTPATIENT)
Dept: FAMILY MEDICINE | Facility: CLINIC | Age: 67
End: 2022-01-19
Payer: COMMERCIAL

## 2022-01-19 VITALS
WEIGHT: 187 LBS | RESPIRATION RATE: 14 BRPM | TEMPERATURE: 98.3 F | HEART RATE: 58 BPM | BODY MASS INDEX: 26.83 KG/M2 | DIASTOLIC BLOOD PRESSURE: 85 MMHG | SYSTOLIC BLOOD PRESSURE: 144 MMHG

## 2022-01-19 DIAGNOSIS — R06.83 SNORING: ICD-10-CM

## 2022-01-19 DIAGNOSIS — R03.0 ELEVATED BP WITHOUT DIAGNOSIS OF HYPERTENSION: ICD-10-CM

## 2022-01-19 DIAGNOSIS — K21.9 GASTROESOPHAGEAL REFLUX DISEASE WITHOUT ESOPHAGITIS: Primary | ICD-10-CM

## 2022-01-19 PROCEDURE — 99213 OFFICE O/P EST LOW 20 MIN: CPT | Performed by: FAMILY MEDICINE

## 2022-01-19 NOTE — PROGRESS NOTES
OFFICE VISIT - FAMILY MEDICINE     ASSESSMENT AND PLAN       ICD-10-CM    1. Gastroesophageal reflux disease without esophagitis  K21.9 Adult Gastro Ref - Procedure Only   2. Snoring  R06.83 SLEEP EVALUATION & MANAGEMENT REFERRAL - ADULT -    Chronic GERD with new dysphagia , refer to gastro for EGD, continue PPI alternating with H2 blocker.  Consider adjusting the dose based on finding on symptoms.  Snoring, referred to sleep clinic for sleep study consideration.  Elevated BP, continue with healthy lifestyle changes, monitor BP at next visit.  CHIEF COMPLAINT   Snoring and Throat Problem       HPI   Juan Carlos Helton is a 66 year old male.  No Patient Care Coordination Note on file.  Chronic GERD currently on omeprazole 4 times a week and Pepcid 3 times a week, has noticed increasing difficulty swallowing, sometimes spitting up saliva, no vomiting, no weight loss or nausea.  Has not had an EGD for many years.  Has had a screening colonoscopy done in 2018.  Wife also mention that he has been snoring a little more lately, never had an evaluation for sleep apnea but interested on.  Started working on [employer] device few months ago will be keeping it for about a year and a half.    Review of Systems As per HPI, otherwise negative.    OBJECTIVE   BP (!) 144/85 (BP Location: Left arm, Patient Position: Sitting, Cuff Size: Adult Regular)   Pulse 58   Temp 98.3  F (36.8  C) (Temporal)   Resp 14   Wt 84.8 kg (187 lb)   BMI 26.83 kg/m    Physical Exam  Constitutional:       Appearance: Normal appearance.   HENT:      Head: Normocephalic and atraumatic.   Cardiovascular:      Rate and Rhythm: Normal rate and regular rhythm.   Pulmonary:      Effort: Pulmonary effort is normal.      Breath sounds: Normal breath sounds.   Abdominal:      General: There is no distension.      Palpations: There is no mass.      Tenderness: There is no abdominal tenderness. There is no guarding or rebound.   Musculoskeletal:      Cervical  back: Normal range of motion and neck supple.   Neurological:      General: No focal deficit present.      Mental Status: He is alert and oriented to person, place, and time.   Psychiatric:         Behavior: Behavior normal.         Thought Content: Thought content normal.         Judgment: Judgment normal.         PFSH     Family History   Problem Relation Age of Onset     Cancer Mother      Heart Disease Mother      Arthritis Father      Hearing Loss Father      Social History     Socioeconomic History     Marital status:      Spouse name: Not on file     Number of children: Not on file     Years of education: Not on file     Highest education level: Not on file   Occupational History     Not on file   Tobacco Use     Smoking status: Never Smoker     Smokeless tobacco: Never Used   Substance and Sexual Activity     Alcohol use: Yes     Drug use: Never     Sexual activity: Yes   Other Topics Concern     Not on file   Social History Narrative     Not on file     Social Determinants of Health     Financial Resource Strain: Not on file   Food Insecurity: Not on file   Transportation Needs: Not on file   Physical Activity: Not on file   Stress: Not on file   Social Connections: Not on file   Intimate Partner Violence: Not on file   Housing Stability: Not on file       PMSH   [unfilled]  Past Surgical History:   Procedure Laterality Date     APPENDECTOMY       ARTHRODESIS ANKLE Right     S/P subtalar arthrodesis, PTT synoverctomy, 2 MT osteotomy, IP joint hemiresection, EDL/gastroc lengthening 8/4/2011     INGUINAL HERNIA REPAIR       REPAIR HAMMER TOE Left 4/30/2021    Procedure: CORRECTION, HAMMER TOE, left second and third toe, EXCISION, METATARSAL BONE, HEAD second and third left toes, RELEASE, TENDON, EXTENSOR second and third toe left foot;  Surgeon: Perico Neri DPM;  Location: [facility];  Service: Podiatry     REPAIR HAMMER TOE Left 4/30/2021    Procedure: RELEASE, TENDON, EXTENSOR second  and third toe left foot;  Surgeon: Perico Neri DPM;  Location: [facility];  Service: Podiatry       RESULTS/CONSULTS (Lab/Rad)   No results found for this or any previous visit (from the past 168 hour(s)).  XR Foot 3 Views Standing Left  Exam performed: Left foot    Indication: Postoperative x-rays    Report: The AP, lateral, and lateral beak views show there has been a resection of the head of the second and third metatarsals as well as resection of the head of the proximal phalanx digits 2 and 3.  All osseous structures are well aligned.    Impression: Postsurgical changes as mentioned     [unfilled]    HEALTH MAINTENANCE / SCREENING   [unfilled], [unfilled],[unfilled]  Immunization History   Administered Date(s) Administered     COVID-19,PF,Pfizer (12+ Yrs) 03/29/2021, 04/19/2021, 11/01/2021     FLU 6-35 months 09/24/2009, 10/08/2010, 10/11/2011, 09/27/2012     Influenza (H1N1) 11/18/2009     Influenza (IIV3) PF 10/25/2003, 12/23/2004, 01/04/2006, 10/23/2006, 10/24/2007, 10/28/2008     Influenza,INJ,MDCK,PF,Quad >4yrs 10/12/2018     Tdap (Adacel,Boostrix) 02/01/2008, 06/14/2017, 01/08/2018     Zoster vaccine recombinant adjuvanted (SHINGRIX) 09/23/2019, 11/24/2019     Health Maintenance   Topic     PREVENTIVE CARE VISIT      ANNUAL REVIEW OF  ORDERS      COLORECTAL CANCER SCREENING      ADVANCE CARE PLANNING      LIPID      DTAP/TDAP/TD IMMUNIZATION (4 - Td or Tdap)     HEPATITIS C SCREENING      HIV SCREENING      PHQ-2      INFLUENZA VACCINE      ZOSTER IMMUNIZATION      COVID-19 Vaccine      Pneumococcal Vaccine: Pediatrics (0 to 5 Years) and At-Risk Patients (6 to 64 Years)      IPV IMMUNIZATION      MENINGITIS IMMUNIZATION      HEPATITIS B IMMUNIZATION      Review of external notes as documented elsewhere in note  25 minutes spent on the date of the encounter doing chart review, review of outside records, review of test results, interpretation of tests, patient visit  and documentation          Juarez Chamberlain MD  Family Medicine, [facility]   This transcription uses voice recognition software, which may contain typographical errors.

## 2022-01-19 NOTE — PATIENT INSTRUCTIONS
HYPERTENSION GUIDELINES (derived from ICSI 2006)    I. CONFIRMATION OF ELEVATED BLOOD PRESSURE    Confirmation is based on the initial visit plus 2 follow-up visits with at least 2 readings taken at each visit.    JNC 7 CLASSIFICATION OF BLOOD PRESSURE FOR ADULTS AGED 18 YEARS AND OLDER:  Category: Blood Pressure, Systolic (mmHg)   Blood Pressure,  Diastolic (mmHG   Normal     <120 and               <80   Prehypertension     120-139 or               80-89   Hypertension     Stage 1     Stage 2       140-159      >/=160   or  or                 90-99               >/=100     II. CONSIDER SECONDARY CAUSES:    Consider if: abrupt onset of symptomatic hypertension, Stage 2 hypertension, hypertensive crisis, abrupt change in previously well-controlled hypertension, drug-resistant hypertension and in individuals with no family history of hypertension.    Secondary causes:   Chronic renal disease   Thyroid and parathyroid disease   Drugs (prescription, over-the-counter, herbal, illicit)   Excessive alcohol use   Obstructive sleep apnea   Primary hyperaldosteronism   Renal artery stenosis   Pheochromocytoma   Cushing's syndrome   Aortic coarctation   Obesity    III. LIFESTYLE MODIFICATIONS AND DRUG THERAPY    Lifestyle Modifications to Prevent and Manage Hypertension:  Modification Recommendation Approximate SBP Reduction   Weight reduction Maintain BMI 18.5-24.9   5-20mmHG/10Kg   DASH diet Diet rich in fruit, vegetables, with reduced total/saturated fat     8-14 mmHG   Dietary sodium reduction Reduce dietary sodium to no more than 2.4 gm sodium daily   2-8 mmHg   Physical Activity Regular aerobic physical activity such as brisk walking (30-45 min) per day, most days of the week)   4-9 mmHG   Moderation of alcohol intake Limit to no more than 2 drings per day in men and 1 drink per day in women   2-4mmHG   Tobacco avoidance     Stress management     Medication management A thiazide diuretic simin glass considered as  initial management.  In patients for whom diuretics are contraindicated or poorly tolerated, use of a beta blocker, ACEI, ARB or calcium channel antagonist is appropriate.  Long acting dihydropyridine calcium antagonists are appropriate for isolated systolic hypertension.      IV. HYPERTENSION MANAGEMENT GOAL    Goal office blood pressure should be less than 140/90 for all adults.    Goal blood pressure with history of CHF, CRF, diabetes or ASCVD is less than 130/80    V.  RESISTANT HYPERTENSION    A patient has resistant hypertension when blood pressure goals are not met despite compliance with a triple drug regimen that includes a diuretic.    Causes include:  improper measurement (over inflation of the cuff or using cuff that is too small) leading to inaccurately high readings  brachial arteries may be heavily calcified/arteriosclerotic and cannot be fully compressed (pseudo-hypertension)  clinical or white coat hypertension  inadequate doses of medication  drug interactions

## 2022-01-21 ASSESSMENT — SLEEP AND FATIGUE QUESTIONNAIRES
HOW LIKELY ARE YOU TO NOD OFF OR FALL ASLEEP WHILE WATCHING TV: WOULD NEVER DOZE
HOW LIKELY ARE YOU TO NOD OFF OR FALL ASLEEP WHILE SITTING QUIETLY AFTER LUNCH WITHOUT ALCOHOL: WOULD NEVER DOZE
HOW LIKELY ARE YOU TO NOD OFF OR FALL ASLEEP WHILE SITTING AND TALKING TO SOMEONE: WOULD NEVER DOZE
HOW LIKELY ARE YOU TO NOD OFF OR FALL ASLEEP IN A CAR, WHILE STOPPED FOR A FEW MINUTES IN TRAFFIC: WOULD NEVER DOZE
HOW LIKELY ARE YOU TO NOD OFF OR FALL ASLEEP WHILE SITTING AND READING: SLIGHT CHANCE OF DOZING
HOW LIKELY ARE YOU TO NOD OFF OR FALL ASLEEP WHILE LYING DOWN TO REST IN THE AFTERNOON WHEN CIRCUMSTANCES PERMIT: MODERATE CHANCE OF DOZING
HOW LIKELY ARE YOU TO NOD OFF OR FALL ASLEEP WHEN YOU ARE A PASSENGER IN A CAR FOR AN HOUR WITHOUT A BREAK: WOULD NEVER DOZE
HOW LIKELY ARE YOU TO NOD OFF OR FALL ASLEEP WHILE SITTING INACTIVE IN A PUBLIC PLACE: WOULD NEVER DOZE

## 2022-01-25 ENCOUNTER — TRANSFERRED RECORDS (OUTPATIENT)
Dept: HEALTH INFORMATION MANAGEMENT | Facility: CLINIC | Age: 67
End: 2022-01-25
Payer: COMMERCIAL

## 2022-01-25 PROBLEM — M20.42 HAMMERTOE OF LEFT FOOT: Status: RESOLVED | Noted: 2021-04-01 | Resolved: 2022-01-25

## 2022-01-25 PROBLEM — M21.6X2 PLANTAR FLEXED METATARSAL BONE OF LEFT FOOT: Status: RESOLVED | Noted: 2021-04-01 | Resolved: 2022-01-25

## 2022-01-25 PROBLEM — M62.40 CONTRACTED, TENDON: Status: RESOLVED | Noted: 2021-04-01 | Resolved: 2022-01-25

## 2022-01-25 PROBLEM — K21.9 GASTROESOPHAGEAL REFLUX DISEASE WITHOUT ESOPHAGITIS: Chronic | Status: ACTIVE | Noted: 2019-06-17

## 2022-01-26 ENCOUNTER — VIRTUAL VISIT (OUTPATIENT)
Dept: SLEEP MEDICINE | Facility: CLINIC | Age: 67
End: 2022-01-26
Attending: FAMILY MEDICINE
Payer: COMMERCIAL

## 2022-01-26 VITALS — HEIGHT: 71 IN | BODY MASS INDEX: 25.34 KG/M2 | WEIGHT: 181 LBS

## 2022-01-26 DIAGNOSIS — R53.83 MALAISE AND FATIGUE: ICD-10-CM

## 2022-01-26 DIAGNOSIS — R06.83 SNORING: ICD-10-CM

## 2022-01-26 DIAGNOSIS — R06.89 DYSPNEA AND RESPIRATORY ABNORMALITY: Primary | ICD-10-CM

## 2022-01-26 DIAGNOSIS — G47.30 SLEEP APNEA, UNSPECIFIED TYPE: ICD-10-CM

## 2022-01-26 DIAGNOSIS — Z72.820 LACK OF ADEQUATE SLEEP: ICD-10-CM

## 2022-01-26 DIAGNOSIS — R06.00 DYSPNEA AND RESPIRATORY ABNORMALITY: Primary | ICD-10-CM

## 2022-01-26 DIAGNOSIS — R53.81 MALAISE AND FATIGUE: ICD-10-CM

## 2022-01-26 PROCEDURE — 99214 OFFICE O/P EST MOD 30 MIN: CPT | Mod: GT | Performed by: PHYSICIAN ASSISTANT

## 2022-01-26 RX ORDER — ZOLPIDEM TARTRATE 5 MG/1
TABLET ORAL
Qty: 1 TABLET | Refills: 0 | Status: SHIPPED | OUTPATIENT
Start: 2022-01-26 | End: 2022-09-12

## 2022-01-26 ASSESSMENT — MIFFLIN-ST. JEOR: SCORE: 1633.14

## 2022-01-26 NOTE — PATIENT INSTRUCTIONS
Your BMI is Body mass index is 25.24 kg/m .  Weight management is a personal decision.  If you are interested in exploring weight loss strategies, the following discussion covers the approaches that may be successful. Body mass index (BMI) is one way to tell whether you are at a healthy weight, overweight, or obese. It measures your weight in relation to your height.  A BMI of 18.5 to 24.9 is in the healthy range. A person with a BMI of 25 to 29.9 is considered overweight, and someone with a BMI of 30 or greater is considered obese. More than two-thirds of American adults are considered overweight or obese.  Being overweight or obese increases the risk for further weight gain. Excess weight may lead to heart disease and diabetes.  Creating and following plans for healthy eating and physical activity may help you improve your health.  Weight control is part of healthy lifestyle and includes exercise, emotional health, and healthy eating habits. Careful eating habits lifelong are the mainstay of weight control. Though there are significant health benefits from weight loss, long-term weight loss with diet alone may be very difficult to achieve- studies show long-term success with dietary management in less than 10% of people. Attaining a healthy weight may be especially difficult to achieve in those with severe obesity. In some cases, medications, devices and surgical management might be considered.  What can you do?  If you are overweight or obese and are interested in methods for weight loss, you should discuss this with your provider.     Consider reducing daily calorie intake by 500 calories.     Keep a food journal.     Avoiding skipping meals, consider cutting portions instead.    Diet combined with exercise helps maintain muscle while optimizing fat loss. Strength training is particularly important for building and maintaining muscle mass. Exercise helps reduce stress, increase energy, and improves fitness.  Increasing exercise without diet control, however, may not burn enough calories to loose weight.       Start walking three days a week 10-20 minutes at a time    Work towards walking thirty minutes five days a week     Eventually, increase the speed of your walking for 1-2 minutes at time    And look into health and wellness programs that may be available through your health insurance provider, employer, local community center, or jace club.      MY CONTACT NUMBERS ARE: [phone]  DOCTOR : TC Pate  SLEEP CENTER :   CPAP EQUIPMENT :    IF I HAVE SLEEP APNEA.....  WHERE CAN I FIND MORE INFORMATION?    American Academy of Sleep Medicine Patient information on sleep disorders:  http://yoursleep.aasmnet.org    THINGS TO REMEMBER  In most situations, sleep apnea is a lifelong disease that must be managed with daily therapy. Untreated disease, when severe, may result in an increased risk for an array of problems from heart disease to mood changes, car accidents and shorter lifespan.    CPAP -  WHY AND HOW?  Continuous positive airway pressure, or CPAP, is the most effective treatment for obstructive sleep apnea. A decision to use CPAP is a major step forward in the pursuit of a healthier life. The successful use of CPAP will help you breathe easier, sleep better and live healthier. Using CPAP can be a positive experience if you keep these bermeo points in mind:  1. Commitment  CPAP is not a quick fix for your problem. It involves a long-term commitment to improve your sleep and your health.    2. Communication  Stay in close communication with both your sleep doctor and your CPAP supplier. Ask lots of questions and seek help when you need it.    3. Consistency  Use CPAP all night, every night and for every nap. You will receive the maximum health benefits from CPAP when you use it every time that you sleep. This will also make it easier for your body to adjust to the treatment.    4. Correction  The first  "machine and mask that you try may not be the best ones for you. Work with your sleep doctor and your CPAP supplier to make corrections to your equipment selection. Ask about trying a different type of machine or mask if you have ongoing problems. Make sure that your mask is a good fit and learn to use your equipment properly.    5. Challenge  Tell a family member or close friend to ask you each morning if you used your CPAP the previous night. Have someone to challenge you to give it your best effort.    6. Connection   Your adjustment to CPAP will be easier if you are able to connect with others who use the same treatment. Ask your sleep doctor if there is a support group in your area for people who have sleep apnea, or look for one on the Internet.    7. Comfort   Increase your level of comfort by using a saline spray, decongestant or heated humidifier if CPAP irritates your nose, mouth or throat. Use your unit's \"ramp\" setting to slowly get used to the air pressure level. There may be soft pads you can buy that will fit over your mask straps. Look on www.CPAP.com for accessories such as these straps, a pillow contoured for side-sleeping with CPAP, longer hoses, hose covers to reduce condensation, or stands to keep the hose out of your way.                                 8. Cleaning   Clean your mask, tubing and headgear on a regular basis. Put this time in your schedule so that you don't forget to do it. Check and replace the filters for your CPAP unit and humidifier.    9. Completion   Although you are never finished with CPAP therapy, you should reward yourself by celebrating the completion of your first month of treatment. Expect this first month to be your hardest period of adjustment. It will involve some trial and error as you find the machine, mask and pressure settings that are right for you.    Continuation  After your first month of treatment, continue to make a daily commitment to use your CPAP all " night, every night and for every nap.    CPAP-Tips to starting with success:  Begin using your CPAP for short periods of time during the day while you watch TV or read.    Use CPAP every night and for every nap. Using it less often reduces the health benefits and makes it harder for your body to get used to it.    Newer CPAP models are virtually silent; however, if you find the sound of your CPAP machine to be bothersome, place the unit under your bed to dampen the sound.     Make small adjustments to your mask, tubing, straps and headgear until you get the right fit. Tightening the mask may actually worsen the leak.  If it leaves significant marks on your face or irritates the bridge of your nose, it may not be the best mask for you.  Speak with the person who supplied the mask and consider trying other masks.    Use a saline nasal spray to ease mild nasal congestion. Neti-Pot or saline nasal rinses may also help. Nasal gel sprays can help reduce nasal dryness.  Biotene mouthwash can be helpful to protect your teeth if you experience frequent dry mouth.  Dry mouth may be a sign of air escaping out of your mouth or out of the mask in the case of a full face mask.  Speak with your provider if you expect that is the case.     Take a nasal decongestant to relieve more severe nasal or sinus congestion.  Do not use Afrin (oxymetazoline) nasal spray more than 3 days in a row.  Speak with your sleep doctor if your nasal congestion is chronic.    Use a heated humidifier that fits your CPAP model to enhance your breathing comfort. Adjust the heat setting up if you get a dry nose or throat, down if you get condensation in the hose or mask.  Position the CPAP lower than you so that any condensation in the hose drains back into the machine rather than towards the mask.    Try a system that uses nasal pillows if traditional masks give you problems.    Clean your mask, tubing and headgear once a week. Make sure the equipment  dries fully.    Regularly check and replace the filters for your CPAP unit and humidifier.    Work closely with your sleep doctor and your CPAP supplier to make sure that you have the machine, mask and air pressure setting that works best for you.    BESIDES CPAP, WHAT OTHER THERAPIES ARE THERE?    Postioning devices if you only have the problem on your back    Dental devices if your condition is mild    Nasal valves may be effective though experience is limited    Tongue Retaining Device if missing teeth precludes the use of a dental device    Weight loss if you are overweight    Surgery in limited cases where devices are not acceptable or there are problems with structures in the nose and throat  If treated with one of these alternative options, further evaluation is necessary to ensure that the therapy is effective. This may require some form of testing.     Healthy Lifestyle:  Healthy diet, exercise and Limit alcohol: Not only will excessive alcohol increase your weight over time, but it irritates the throat tissues and make them swell, shrinking the airway and causing snoring. Drinking alcohol should be limited and stopped within 3-4 hours before going to bed.   Stop smoking: (Red swollen throat, heat, nicotine), also irritates and swells the airway, among numerous other negative health consequences.    Positioning Device  This example shows a pillow that straps around the waist. It may be appropriate for those whose sleep study shows milder sleep apnea that occurs primarily when lying flat on one's back. Preliminary studies have shown benefit but effectiveness at home should be verified.    Nasal Valves              Nasal valves may not be effective if you have frequent nasal congestion or have difficulty breathing through your nose. They may be an option for mild apnea if other options are not well tolerated. The efficacy of these devices is generally less than CPAP or oral appliances.  Oral Appliance  These  are examples of two of many custom-made devices that are more likely to work in mild sleep apnea  Oral appliances are dental mouth pieces that fit very much like a sports mouth guards or removable orthodontic retainers. They are used to treat snoring  And obstructive sleep apnea . The device prevents the airway from collapsing by either holding the tongue or supporting the jaw in a forward position. Since oral appliances are non-invasive and easy to use, they may be considered as an early treatment option. Oral appliance therapy (OAT) involves the customization, selection, fabrication, fitting, adjustments and long-term follow-up care of specially designed oral devices, worn during sleep, which reposition the lower jaw and tongue base forward to maintain an open airway.  Custom made oral appliances are proven to be more effective than over-the-counter devices. Therefore, the over-the-counter devices are recommended not to be used as a screening tool nor as a therapeutic option.  Who gets a dental device?  Oral appliance therapy can be used as an alternative to  CPAP therapy for the treatment of mild to moderate sleep apnea and for those patients who prefer OAT to CPAP. Oral appliance therapy is a first line therapy for the treatment of primary snoring. Additionally, OAT is an option for those that cannot tolerate CPAP as therapy or who have experienced insufficient surgical results.    Possible side effects?  Frequent but minor side effects include: excessive salivation, dry mouth, discomfort of teeth and jaw and temporary changes in the patient s bite.  Potential complications include: jaw pain, permanent occlusal changes and TMJ symptoms.  The above mentioned side effects and complications can be recognized and managed by dentists trained in dental sleep medicine.    Finding a dentist that practices dental sleep medicine  Specific training is available through the American Academy of Dental Sleep Medicine for  dentists interested in working in the field of sleep. To find a dentist who is educated in the field of sleep and the use of oral appliances, near you, visit the Web site of the American Academy of Dental Sleep Medicine; also see http://www.accpstorage.org/newOrganization/patients/oralAppliances.pdf   To search for a dentist certified in these practices:  Http://aadsm.org/FindADentist.aspx?1  Http://www.accpstorage.org/newOrganization/patients/oralAppliances.pdf    Tongue Retaining Device               Tongue Retaining Devices are devices that generally  suction cup  onto the tongue preventing it from falling back into the back of the throat during sleep.  They may be an option for people missing teeth, but can be uncomfortable. This particular device can be purchased online, but similar devices made by dentists fit more precisely and may be tolerated better. In general, they are rarely effective and often not very well tolerated.    Weight Loss:  Some patients may experience reduction or elimination of sleep apnea with weight loss.  Though there are significant health benefits from weight loss, long-term weight loss is very difficult to achieve- studies show success with dietary management in less that 10% of people.     If you are interested in dietary weight loss, you should review the options discussed at the National Institutes of Health patient information site:     Http:/www.health.nih.gov/topic/WeightLossDieting    Bariatric programs offer counseling in all methods of weight loss:    Http:/www.[facility].org/Specialties/WeightLossSurgeryandMedicalMgmt/htm    Surgery:  There are a number of surgeries that have been attempted to treat apnea. In general, surgical options are usually reserved for cases in which there is a physical abnormality contributing to obstruction or other treatment options are ineffective or not tolerated. Most surgical options are either unreliable or quite invasive. One of the  "more common procedures is:  Uvulopalatopharyngoplasty: In this procedure, the uvula (the finger-like tissue that hangs in the back of the throat), part of the soft palate (the tissue that the uvula is attached to), and sometimes the tonsils or adenoids are removed. The efficacy of this surgery is around 30-50% .  After surgery, complications may include:  Sleepiness and sleep apnea related to post-surgery medication   Swelling, infection and bleeding   A sore throat and/or difficulty swallowing   Drainage of secretions into the nose and a nasal quality to the voice. English language speech does not seem to be affected by this surgery.   Narrowing of the airway in the nose and throat (hence constricting breathing) snoring and even iatrogenically caused sleep apnea. By cutting the tissues, excess scar tissue can \"tighten\" the airway and make it even smaller than it was before UPPP.  Patients who have had the uvula removed will become unable to correctly speak Mexican or any other language that has a uvular 'r' phoneme.    Surgeries to help resolve nasal congestion may help reduce the severity of apnea slightly. Nasal congestion does not cause apnea on its own, so these surgeries are usually not performed just for OANH.  They may be worth considering if the nasal congestion is significantly bothersome independent of apnea.    "

## 2022-01-26 NOTE — PROGRESS NOTES
Juan Carlos is a 66 year old who is being evaluated via a billable video visit.      How would you like to obtain your AVS? MyChart  If the video visit is dropped, the invitation should be resent by: Send to e-mail at: [email or web address]  Will anyone else be joining your video visit? No      Video Start Time: 10:56 AM  Video-Visit Details    Type of service:  Video Visit    Video End Time:11:31 AM    Originating Location (pt. Location): Home    Distant Location (provider location):  [facility]     Platform used for Video Visit: Well       Sleep Consultation:    Date on this visit: 1/26/2022    Juan Carlos Helton is sent by Juarez Chamberlain for a sleep consultation regarding snoring.    Primary Physician: Juarez Chamberlain     Chief Complaint   Patient presents with     Video Visit     snoring, waking through night        Juan Carlos Helton is a 66 year old male with medical history remarkable for GERD.     Juan Carlos goes to sleep between 10:30 and 11:00 PM during the week. He wakes up between 6:15 and 6:45 AM without an alarm. He falls asleep in 15 minutes.  Juan Carlos denies difficulty falling asleep.  He wakes up 2 times a night for 5 minutes before falling back to sleep.  Juan Carlos wakes up to go to the bathroom.  On weekends, Juan Carlos goes to sleep at 11:00 PM.  He wakes up at 7:00 AM without an alarm. He falls asleep in 15 minutes.  Patient gets an average of 7 hours of sleep per night.     Patient does not use electronics in bed and watch TV in bed.     Juan Carlos does not do shift work.      Juan Carlos does snore every night and snoring is loud. He is undergoing invisalign treatment. Patient does have a regular bed partner. There is report of snoring and leg motion.  He does have witnessed apneas on rare occasions. They never sleep separately.  Patient sleeps on his back and sides. He denies morning headaches and morning confusion. Juan Carlos denies any sleep walking, sleep talking, dream enactment, sleep paralysis, cataplexy  and hypnogogic/hypnopompic hallucinations. He has bruxism.     Juan Carlos has reflux at night, denies difficulty breathing through his nose.      Juan Carlos has lost 20 pounds in 2 years.  Patient's [place] Sleepiness score 3/24 inconsistent with excessive daytime sleepiness.  He feels sleepy after lunch.    Juan Carlos naps 4-5 times per week for 30-60 minutes, feels refreshed after naps. He takes no inadvertant naps.  He denies falling asleep while driving.  Patient was counseled on the importance of driving while alert, to pull over if drowsy, or nap before getting into the vehicle if sleepy.  He uses 2 cups/day of coffee. Last caffeine intake is usually before noon.    Allergies:    No Known Allergies    Medications:    Current Outpatient Medications   Medication Sig Dispense Refill     ascorbic acid 1000 MG TABS tablet Take 1,000 mg by mouth       cholecalciferol 50 MCG (2000 UT) CAPS        fexofenadine (ALLEGRA) 180 MG tablet Take 180 mg by mouth       Fish Oil-Cholecalciferol (FISH OIL + D3 PO)        Green Tea 150 MG CAPS daily       Ibuprofen (ADVIL PO) Take  by mouth as needed.       Multiple Vitamins-Minerals (MULTIVITAMIN ADULT PO)        omeprazole (PRILOSEC) 20 MG DR capsule TAKE 1 CAPSULE BY MOUTH EVERY DAY         Problem List:  Patient Active Problem List    Diagnosis Date Noted     Gastroesophageal reflux disease without esophagitis 06/17/2019     Priority: Medium     Arthritis      Priority: Medium     High cholesterol      Priority: Medium     Osteoarthritis of subtalar joint 05/18/2011     Priority: Medium        Past Medical/Surgical History:  Past Medical History:   Diagnosis Date     Arthritis      Contracted, tendon 4/1/2021    Added automatically from request for surgery [id number]      Hammertoe of left foot 4/1/2021    Added automatically from request for surgery [id number]      High cholesterol      Plantar flexed metatarsal bone of left foot 4/1/2021    Added automatically from request for surgery [id number]       Tibialis posterior tendonitis 5/18/2011     Past Surgical History:   Procedure Laterality Date     APPENDECTOMY       ARTHRODESIS ANKLE Right     S/P subtalar arthrodesis, PTT synoverctomy, 2 MT osteotomy, IP joint hemiresection, EDL/gastroc lengthening 8/4/2011     INGUINAL HERNIA REPAIR       REPAIR HAMMER TOE Left 4/30/2021    Procedure: CORRECTION, HAMMER TOE, left second and third toe, EXCISION, METATARSAL BONE, HEAD second and third left toes, RELEASE, TENDON, EXTENSOR second and third toe left foot;  Surgeon: Perico Neri DPM;  Location: [facility];  Service: Podiatry     REPAIR HAMMER TOE Left 4/30/2021    Procedure: RELEASE, TENDON, EXTENSOR second and third toe left foot;  Surgeon: Perico Neri DPM;  Location: [facility];  Service: Podiatry       Social History:  Social History     Socioeconomic History     Marital status:      Spouse name: Not on file     Number of children: Not on file     Years of education: Not on file     Highest education level: Not on file   Occupational History     Not on file   Tobacco Use     Smoking status: Never Smoker     Smokeless tobacco: Never Used   Substance and Sexual Activity     Alcohol use: Yes     Drug use: Never     Sexual activity: Yes   Other Topics Concern     Not on file   Social History Narrative     Not on file     Social Determinants of Health     Financial Resource Strain: Not on file   Food Insecurity: Not on file   Transportation Needs: Not on file   Physical Activity: Not on file   Stress: Not on file   Social Connections: Not on file   Intimate Partner Violence: Not on file   Housing Stability: Not on file       Family History:  Family History   Problem Relation Age of Onset     Cancer Mother      Heart Disease Mother      Arthritis Father      Hearing Loss Father        Review of Systems:  Answers for HPI/ROS submitted by the patient on 1/21/2022  General Symptoms: No  Skin Symptoms: No  HENT Symptoms: No  EYE  "SYMPTOMS: No  HEART SYMPTOMS: No  LUNG SYMPTOMS: No  INTESTINAL SYMPTOMS: No  URINARY SYMPTOMS: No  REPRODUCTIVE SYMPTOMS: No  SKELETAL SYMPTOMS: No  BLOOD SYMPTOMS: No  NERVOUS SYSTEM SYMPTOMS: No  MENTAL HEALTH SYMPTOMS: No    Physical Examination:  Vitals: Ht 1.803 m (5' 11\")   Wt 82.1 kg (181 lb)   BMI 25.24 kg/m    BMI= Body mass index is 25.24 kg/m .         [place] Total Score 1/21/2022   Total score - [place] 3       OSMAR Total Score: 14 (01/21/22 1020)    GENERAL APPEARANCE: alert and no distress  EYES: Eyes grossly normal to inspection  RESP: breathing is non-labored  NEURO: mentation intact and speech normal  PSYCH: affect normal/bright    Last Comprehensive Metabolic Panel:  Sodium   Date Value Ref Range Status   09/09/2021 135 (L) 136 - 145 mmol/L Final     Potassium   Date Value Ref Range Status   09/09/2021 4.3 3.5 - 5.0 mmol/L Final     Chloride   Date Value Ref Range Status   09/09/2021 100 98 - 107 mmol/L Final     Carbon Dioxide (CO2)   Date Value Ref Range Status   09/09/2021 23 22 - 31 mmol/L Final     Anion Gap   Date Value Ref Range Status   09/09/2021 12 5 - 18 mmol/L Final     Glucose   Date Value Ref Range Status   09/09/2021 65 (L) 70 - 125 mg/dL Final     Urea Nitrogen   Date Value Ref Range Status   09/09/2021 17 8 - 22 mg/dL Final     Creatinine   Date Value Ref Range Status   09/09/2021 0.91 0.70 - 1.30 mg/dL Final     GFR Estimate   Date Value Ref Range Status   09/09/2021 89 >60 mL/min/1.73m2 Final     Comment:     As of July 11, 2021, eGFR is calculated by the CKD-EPI creatinine equation, without race adjustment. eGFR can be influenced by muscle mass, exercise, and diet. The reported eGFR is an estimation only and is only applicable if the renal function is stable.   09/08/2020 >60 >60 mL/min/1.73m2 Final     Calcium   Date Value Ref Range Status   09/09/2021 9.5 8.5 - 10.5 mg/dL Final     TSH   Date Value Ref Range Status   09/08/2020 1.92 0.30 - 5.00 uIU/mL Final "         Impression:  Patient has features and risk factors for possible obstructive sleep apnea including: loud snoring, witnessed apnea, bruxism, daytime fatigue/sleepiness (ESS 3) and difficulty maintaining sleep.  The STOP-BANG score is 5/8. The pathophysiology, diagnosis and treatment of OANH was discussed.   Plan:    1. Polysomnogram (using 4% desaturation/Medicare/ AASM 1B scoring rules) for high probability obstructive sleep apnea. Ambien if needed.   2. Advised him against drowsy driving.    3. Recommend weight management which he is already working on.     Literature provided regarding sleep apnea.      He will follow up with me in approximately two weeks after his sleep study has been competed to review the results and discuss plan of care.       Polysomnography reviewed.  Obstructive sleep apnea reviewed.  Complications of untreated sleep apnea were reviewed.    Ely Martinez PA-C    CC: Juarez Chamberlain

## 2022-01-29 DIAGNOSIS — K21.9 GASTRO-ESOPHAGEAL REFLUX DISEASE WITHOUT ESOPHAGITIS: ICD-10-CM

## 2022-01-31 NOTE — TELEPHONE ENCOUNTER
"Outpatient Medication Detail     Disp Refills Start End XENA   omeprazole (PRILOSEC) 20 MG capsule 90 capsule 2 12/18/2020  No   Sig: TAKE 1 CAPSULE BY MOUTH EVERY DAY   Sent to pharmacy as: omeprazole 20 mg capsule,delayed release (PriLOSEC)   E-Prescribing Status: Receipt confirmed by pharmacy (12/18/2020  9:21 PM CST)       omeprazole (PRILOSEC) 20 MG capsule [[id number]]    Electronically signed by: Sharon Iglesias RN on 12/18/20 2121 Status: Active   Ordering user: Sharon Iglesias RN 12/18/20 2121 Ordering provider: Juarez Chamberlain MD   Authorized by: Juarez Chamberlain MD   Frequency:  12/18/20 - Until Discontinued Released by: Sharon Iglesias RN 12/18/20 2121   Diagnoses  Gastroesophageal reflux disease without esophagitis [K21.9]     Routing refill request to provider for review/approval because:  A break in medication    Last office visit provider:  1/19/22     Requested Prescriptions   Pending Prescriptions Disp Refills     omeprazole (PRILOSEC) 20 MG DR capsule [Pharmacy Med Name: OMEPRAZOLE DR 20 MG CAPSULE] 90 capsule 2     Sig: TAKE 1 CAPSULE BY MOUTH EVERY DAY       PPI Protocol Passed - 1/29/2022 10:30 AM        Passed - Not on Clopidogrel (unless Pantoprazole ordered)        Passed - No diagnosis of osteoporosis on record        Passed - Recent (12 mo) or future (30 days) visit within the authorizing provider's specialty     Patient has had an office visit with the authorizing provider or a provider within the authorizing providers department within the previous 12 mos or has a future within next 30 days. See \"Patient Info\" tab in inbasket, or \"Choose Columns\" in Meds & Orders section of the refill encounter.              Passed - Medication is active on med list        Passed - Patient is age 18 or older             Car Ashraf RN 01/31/22 2:47 PM  "

## 2022-02-01 ENCOUNTER — TELEPHONE (OUTPATIENT)
Dept: SLEEP MEDICINE | Facility: CLINIC | Age: 67
End: 2022-02-01
Payer: COMMERCIAL

## 2022-02-01 NOTE — TELEPHONE ENCOUNTER
Reason for call:  Other   Patient called regarding (reason for call): call back  Additional comments: Patient had called to ask if the prior authorization had been started yet. Didn't see any status that it was started. Patient will call back in about a week to check again.    Phone number to reach patient:  Cell number on file:    Telephone Information:   Mobile [phone]       Best Time:  Anytime    Can we leave a detailed message on this number?  YES    Travel screening: Not Applicable

## 2022-02-01 NOTE — TELEPHONE ENCOUNTER
Patient has been scheduled with a sleep study, patient informed that [employer] will verify if a PA is needed.

## 2022-02-22 ENCOUNTER — TELEPHONE (OUTPATIENT)
Dept: SLEEP MEDICINE | Facility: CLINIC | Age: 67
End: 2022-02-22
Payer: COMMERCIAL

## 2022-02-22 NOTE — TELEPHONE ENCOUNTER
HST order placed. Please notify the patient and facilitate testing and follow up.   
Pt rescheduled for HST and f/u due to  insurance denial for PSG and pt will be out of state mid April.    Markos Walls, Visit facilitator    
Received following email please advise:    Peer to Peer Request    Patient Name:                        Juan Carlos Helton  :                                      1955  MRN:                                      [id number]    Dr. Martinez,    The authorization for procedure PSG DIAGNOSTIC on date of service 2022 has been denied. We were unsuccessful in obtaining approval through clinical review. A [id number] review can be done by calling the insurance/third party authorization vendor with the following information:    Insurance:         Auth Vendor:     Phone:             [phone]  Due:                ASAP    Clinicals already Provided  Order:                          N/A  Visit Notes:                  2022  Results:                       N/A  Other:                          N/A    Patient ID:       [id number]  Case/Ref #:     [id number]    Patient contact:           N/A  Patient response:        na  Patient Phone #:         na    Denial Reason: Pt lacks qualifying comorbid conditions that may degrade the accuracy of an HST. No previous HST completed. Pt meets review criteria for HST at this time per  reviewer.      If you feel you have additional clinical information that could get this denial overturned, please complete the Peer to Peer.  If you choose not to do the P2P, please let me know as soon as possible so that we can reach out to the patient and advise them of their denial.      Thank you,    Mariana AHMADI  [facility]        Mariana Palmer  Financial [employer]uring Representative.Sleep Medicine.  [facility] [employer]  [email or web address]  Office: [phone]  Fax [phone]  Employed by [facility] services  Business Hours M-  6:30AM-3:00PM  [address] [place] [address]  
yes

## 2022-02-24 ENCOUNTER — TRANSFERRED RECORDS (OUTPATIENT)
Dept: HEALTH INFORMATION MANAGEMENT | Facility: CLINIC | Age: 67
End: 2022-02-24
Payer: COMMERCIAL

## 2022-03-29 ENCOUNTER — VIRTUAL VISIT (OUTPATIENT)
Dept: URGENT CARE | Facility: CLINIC | Age: 67
End: 2022-03-29
Payer: COMMERCIAL

## 2022-03-29 ENCOUNTER — OFFICE VISIT (OUTPATIENT)
Dept: FAMILY MEDICINE | Facility: CLINIC | Age: 67
End: 2022-03-29
Payer: COMMERCIAL

## 2022-03-29 VITALS
RESPIRATION RATE: 20 BRPM | TEMPERATURE: 98 F | SYSTOLIC BLOOD PRESSURE: 149 MMHG | DIASTOLIC BLOOD PRESSURE: 94 MMHG | BODY MASS INDEX: 25.62 KG/M2 | OXYGEN SATURATION: 97 % | WEIGHT: 183.7 LBS | HEART RATE: 61 BPM

## 2022-03-29 DIAGNOSIS — R19.7 DIARRHEA, UNSPECIFIED TYPE: ICD-10-CM

## 2022-03-29 DIAGNOSIS — A08.4 VIRAL GASTROENTERITIS: Primary | ICD-10-CM

## 2022-03-29 DIAGNOSIS — R19.7 DIARRHEA, UNSPECIFIED TYPE: Primary | ICD-10-CM

## 2022-03-29 PROCEDURE — 99213 OFFICE O/P EST LOW 20 MIN: CPT | Performed by: NURSE PRACTITIONER

## 2022-03-29 PROCEDURE — 99207 PR NO BILLABLE SERVICE THIS VISIT: CPT | Performed by: NURSE PRACTITIONER

## 2022-03-29 RX ORDER — OMEPRAZOLE 40 MG/1
CAPSULE, DELAYED RELEASE ORAL
COMMUNITY
Start: 2022-02-24

## 2022-03-29 RX ORDER — OMEPRAZOLE 40 MG/1
CAPSULE, DELAYED RELEASE ORAL
COMMUNITY
Start: 2022-02-24 | End: 2022-09-12

## 2022-03-29 ASSESSMENT — ENCOUNTER SYMPTOMS
DIARRHEA: 1
CHILLS: 0
ABDOMINAL PAIN: 0
RESPIRATORY NEGATIVE: 1
FATIGUE: 0
VOMITING: 0
ABDOMINAL DISTENTION: 0
APPETITE CHANGE: 0
DYSURIA: 0
ACTIVITY CHANGE: 0
FEVER: 0
NAUSEA: 0

## 2022-03-29 NOTE — PROGRESS NOTES
SUBJECTIVE:  Chief Complaint   Patient presents with     Diarrhea     Juan Carlos Helton is a 67 year old male whose symptoms began 7 days ago and include diarrhea.     Started as cramping pain, that has continued off and on.   No other cold symptoms  Taking imodium, seemed to help.  Now the last 2 nights up twice last night with diarrhea.    Typically having 4-5 days.   Denies vomiting nausea fever.   Aggravating factors: none   Alleviating factors:none  Associated symptoms:  Pain:No  Appetite: normal  Risk factors: none    Past Medical History:   Diagnosis Date     Arthritis      Contracted, tendon 4/1/2021    Added automatically from request for surgery [id number]      Hammertoe of left foot 4/1/2021    Added automatically from request for surgery [id number]      High cholesterol      Plantar flexed metatarsal bone of left foot 4/1/2021    Added automatically from request for surgery [id number]      Tibialis posterior tendonitis 5/18/2011   .  Current Outpatient Medications   Medication Sig Dispense Refill     ascorbic acid 1000 MG TABS tablet Take 1,000 mg by mouth       cholecalciferol 50 MCG (2000 UT) CAPS        fexofenadine (ALLEGRA) 180 MG tablet Take 180 mg by mouth       Fish Oil-Cholecalciferol (FISH OIL + D3 PO)        Green Tea 150 MG CAPS daily       Ibuprofen (ADVIL PO) Take  by mouth as needed.       Multiple Vitamins-Minerals (MULTIVITAMIN ADULT PO)        omeprazole (PRILOSEC) 20 MG DR capsule TAKE 1 CAPSULE BY MOUTH EVERY DAY 90 capsule 2     zolpidem (AMBIEN) 5 MG tablet Take tablet by mouth 15 minutes prior to sleep, for Sleep Study 1 tablet 0     Social History     Tobacco Use     Smoking status: Never Smoker     Smokeless tobacco: Never Used   Substance Use Topics     Alcohol use: Yes       ROS:  Review of systems negative except as stated above.    OBJECTIVE:  GENERAL APPEARANCE: alert and no distress  RESP: lungs clear   CV: regular rates and rhythm  ABDOMEN:  soft, nontender  SKIN: no suspicious lesions  or rashes    ASSESSMENT:  No diagnosis found.    PLAN:  Recommended urgent care today to do an in person assessment and stool cultures being that diarrhea is over a week now.   Diet: small amounts clear fluids frequently,soups,juices,water,advance diet as tolerated    Telephone time spent 16 minutes    MAIRA Escalante CNP

## 2022-03-29 NOTE — PATIENT INSTRUCTIONS
Patient Education     Viral Diarrhea (Adult)    Diarrhea caused by a virus is often called viral gastroenteritis. Many people call it the stomach flu, but it has nothing to do with the flu. The virus that causes diarrhea affects the stomach and intestinal tract. It often lasts from 2 to 7 days. Diarrhea is the passing of loose, watery stools 3 or more times a day.   Symptoms  Along with diarrhea, you may have these symptoms:    Belly (abdominal) pain and cramping    Nausea and vomiting    Loss of bowel control    Fever and chills    Bloody stools  The danger from repeated diarrhea is dehydration. This is when your body loses too much water and other fluids.   Antibiotics don't work well in treating this illness. But there are things you can do at home that will help.   Home care  Follow these home care tips:    If symptoms are severe, rest at home for the next 24 hours or until you are feeling better.    Wash your hands with soap and water or an alcohol-based . This helps prevent the spread of infection. Wash your hands after touching anyone who is sick.    Teach all people in your home when and how to wash their hands Wet your hands with clean, running water. Lather the backs of your hands, between your fingers, and under your nails. Scrub your hands for at least 20 seconds. If you need a timer, try humming the  Happy Birthday  song from beginning to end twice. Rinse your hands well. Dry them with a clean towel.    Wash your hands after using the toilet and before meals. Clean the toilet after each use.  Food preparation:    People with diarrhea should not make food for others. When making food, wash your hands after touching anyone who is sick.    Wash your hands after using items that have been in contact with raw food. This includes cutting boards, countertops, and knives.    Keep uncooked meats away from cooked and ready-to-eat foods.  Medicines:    You may use acetaminophen or nonsteroidal  anti-inflammatory drugs (NSAIDS) such as ibuprofen or naproxen to control fever unless another medicine was prescribed. In addition:  ? Talk with your healthcare provider before using these medicines if you have chronic liver or kidney disease, or ever had a stomach ulcer or GI (gastrointestinal) bleeding.  ? Don t give aspirin (or medicine that contains aspirin) to anyone younger than age 19 unless directed by the provider. Taking aspirin can put them at risk for Reye syndrome. This is a rare but very serious disorder. It most often affects the brain and the liver.  ? Don't use NSAID medicines if you are already taking one for another condition (such as arthritis) or if you are taking aspirin (such as for heart disease or after a stroke).    Anti-diarrhea medicine should be taken for this condition only if advised by your healthcare provider. Sometimes it can make your condition worse. If you have bloody diarrhea or fever, check with your provider before taking this type of medicine.  Diet:    Water and clear liquids are important so you don't get dehydrated. Drink small amounts at a time. Don't guzzle it down. If you are very dehydrated, sports drinks aren't a good choice. They have too much sugar and not enough electrolytes. In this case, commercially available products called oral rehydration solutions are best.    Caffeine, tobacco, and alcohol can make the diarrhea, cramping, and pain worse. Try to stop using these until you are fully recovered.    Don't force yourself to eat, especially if you have cramping, vomiting, or diarrhea. Don't eat large amounts at a time, even if you are hungry. It may make you feel worse.    If you eat, don't have fatty, greasy, spicy, or fried foods.    Don't have any dairy products, as they can make diarrhea worse.  During the first 24 hours (the first full day) follow the diet below:     Drinks: Water, clear liquids, soft drinks without caffeine; ginger ale, mineral water (plain  or flavored), decaffeinated tea and coffee    Soups: Clear broth, consommé, and bouillon    Desserts: Plain gelatin, ice pops, and fruit juice bars  During the next 24 hours (the second day) you may add these to the above if you are feeling better:     Hot cereal, plain toast, bread, rolls, crackers    Plain noodles, rice, mashed potatoes, chicken noodle or rice soup    Unsweetened canned fruit such as applesauce and bananas (not pineapple and citrus)    Limit fat intake to less than 15 grams per day. Don't eat margarine, butter, oils, mayonnaise, sauces, gravies, fried foods, peanut butter, meat, poultry, and fish.    Limit fiber. Don't eat raw or cooked vegetables, fresh fruits (except bananas), and bran cereals.    Limit caffeine and chocolate. No spices or seasonings except salt.  During the next 24 hours:    Slowly go back to a normal diet, as you feel better and your symptoms ease.    If at any time the diarrhea or cramping gets worse, go back to the simpler diet (above) or to clear liquids.  Follow-up care  Follow up with your healthcare provider, or as advised. Call if you aren't getting better in 24 hours or if the diarrhea lasts more than 1 week. This is even more important if you are in a high-risk group, such as:     Being an older adult    Having a weak immune system (such as from cancer treatment)    Having inflammatory bowel disease (Crohn's disease or colitis)    If a stool (diarrhea) sample was taken, you may call in 2 days (or as directed) for the results.   When to get medical advice  Call your healthcare provider right away if any of these occur:     More belly pain or constant lower right belly pain    Lasting vomiting (can't keep liquids down)    Frequent diarrhea (more than 5 times a day)    Blood in vomit or stool (black or red color)    Eating or drinking less    Dark urine, reduced urine output    Weakness, dizziness    Drowsiness    Fever of 100.4 F (38 C) or higher, or as directed by your  provider    New rash    Symptoms get worse or you have new symptoms  Call 911  Call 911 if any of these occur:     Trouble breathing    Feeling confused    Severe drowsiness or trouble waking up    Fainting or loss of consciousness    Fast heart rate    Seizure    Stiff neck  Doreen last reviewed this educational content on 2/1/2021 2000-2021 [employer]. All rights reserved. This information is not intended as a substitute for professional medical care. Always follow your healthcare professional's instructions.

## 2022-03-29 NOTE — PROGRESS NOTES
Patient presents with:  Diarrhea: x about 1 week. evisit this morning and told to come in      Clinical Decision Making: Focused clinical examination consistent with viral gastroenteritis symptoms that seem to be resolving with no acute abdominal signs. Encourage patient to avoid OTC Imodium at this time as this may prolong symptoms and continue to rehydrate well.  Stool cultures will be ordered in the event that symptoms persist or worsen as discussed. Education provided.      ICD-10-CM    1. Viral gastroenteritis  A08.4    2. Diarrhea, unspecified type  R19.7 Enteric Bacteria and Virus Panel by [facility] Stool     Enteric Bacteria and Virus Panel by [facility] Stool       Patient Instructions     Patient Education     Viral Diarrhea (Adult)    Diarrhea caused by a virus is often called viral gastroenteritis. Many people call it the stomach flu, but it has nothing to do with the flu. The virus that causes diarrhea affects the stomach and intestinal tract. It often lasts from 2 to 7 days. Diarrhea is the passing of loose, watery stools 3 or more times a day.   Symptoms  Along with diarrhea, you may have these symptoms:    Belly (abdominal) pain and cramping    Nausea and vomiting    Loss of bowel control    Fever and chills    Bloody stools  The danger from repeated diarrhea is dehydration. This is when your body loses too much water and other fluids.   Antibiotics don't work well in treating this illness. But there are things you can do at home that will help.   Home care  Follow these home care tips:    If symptoms are severe, rest at home for the next 24 hours or until you are feeling better.    Wash your hands with soap and water or an alcohol-based . This helps prevent the spread of infection. Wash your hands after touching anyone who is sick.    Teach all people in your home when and how to wash their hands Wet your hands with clean, running water. Lather the backs of your hands, between your fingers, and under  your nails. Scrub your hands for at least 20 seconds. If you need a timer, try humming the  Happy Birthday  song from beginning to end twice. Rinse your hands well. Dry them with a clean towel.    Wash your hands after using the toilet and before meals. Clean the toilet after each use.  Food preparation:    People with diarrhea should not make food for others. When making food, wash your hands after touching anyone who is sick.    Wash your hands after using items that have been in contact with raw food. This includes cutting boards, countertops, and knives.    Keep uncooked meats away from cooked and ready-to-eat foods.  Medicines:    You may use acetaminophen or nonsteroidal anti-inflammatory drugs (NSAIDS) such as ibuprofen or naproxen to control fever unless another medicine was prescribed. In addition:  ? Talk with your healthcare provider before using these medicines if you have chronic liver or kidney disease, or ever had a stomach ulcer or GI (gastrointestinal) bleeding.  ? Don t give aspirin (or medicine that contains aspirin) to anyone younger than age 19 unless directed by the provider. Taking aspirin can put them at risk for Reye syndrome. This is a rare but very serious disorder. It most often affects the brain and the liver.  ? Don't use NSAID medicines if you are already taking one for another condition (such as arthritis) or if you are taking aspirin (such as for heart disease or after a stroke).    Anti-diarrhea medicine should be taken for this condition only if advised by your healthcare provider. Sometimes it can make your condition worse. If you have bloody diarrhea or fever, check with your provider before taking this type of medicine.  Diet:    Water and clear liquids are important so you don't get dehydrated. Drink small amounts at a time. Don't guzzle it down. If you are very dehydrated, sports drinks aren't a good choice. They have too much sugar and not enough electrolytes. In this case,  commercially available products called oral rehydration solutions are best.    Caffeine, tobacco, and alcohol can make the diarrhea, cramping, and pain worse. Try to stop using these until you are fully recovered.    Don't force yourself to eat, especially if you have cramping, vomiting, or diarrhea. Don't eat large amounts at a time, even if you are hungry. It may make you feel worse.    If you eat, don't have fatty, greasy, spicy, or fried foods.    Don't have any dairy products, as they can make diarrhea worse.  During the first 24 hours (the first full day) follow the diet below:     Drinks: Water, clear liquids, soft drinks without caffeine; ginger ale, mineral water (plain or flavored), decaffeinated tea and coffee    Soups: Clear broth, consommé, and bouillon    Desserts: Plain gelatin, ice pops, and fruit juice bars  During the next 24 hours (the second day) you may add these to the above if you are feeling better:     Hot cereal, plain toast, bread, rolls, crackers    Plain noodles, rice, mashed potatoes, chicken noodle or rice soup    Unsweetened canned fruit such as applesauce and bananas (not pineapple and citrus)    Limit fat intake to less than 15 grams per day. Don't eat margarine, butter, oils, mayonnaise, sauces, gravies, fried foods, peanut butter, meat, poultry, and fish.    Limit fiber. Don't eat raw or cooked vegetables, fresh fruits (except bananas), and bran cereals.    Limit caffeine and chocolate. No spices or seasonings except salt.  During the next 24 hours:    Slowly go back to a normal diet, as you feel better and your symptoms ease.    If at any time the diarrhea or cramping gets worse, go back to the simpler diet (above) or to clear liquids.  Follow-up care  Follow up with your healthcare provider, or as advised. Call if you aren't getting better in 24 hours or if the diarrhea lasts more than 1 week. This is even more important if you are in a high-risk group, such as:     Being an  older adult    Having a weak immune system (such as from cancer treatment)    Having inflammatory bowel disease (Crohn's disease or colitis)    If a stool (diarrhea) sample was taken, you may call in 2 days (or as directed) for the results.   When to get medical advice  Call your healthcare provider right away if any of these occur:     More belly pain or constant lower right belly pain    Lasting vomiting (can't keep liquids down)    Frequent diarrhea (more than 5 times a day)    Blood in vomit or stool (black or red color)    Eating or drinking less    Dark urine, reduced urine output    Weakness, dizziness    Drowsiness    Fever of 100.4 F (38 C) or higher, or as directed by your provider    New rash    Symptoms get worse or you have new symptoms  Call 911  Call 911 if any of these occur:     Trouble breathing    Feeling confused    Severe drowsiness or trouble waking up    Fainting or loss of consciousness    Fast heart rate    Seizure    Stiff neck  Doreen last reviewed this educational content on 2/1/2021 2000-2021 [employer]. All rights reserved. This information is not intended as a substitute for professional medical care. Always follow your healthcare professional's instructions.               HPI: Juan Carlos Helton is a 67 year old male who presents today complaining of ongoing diarrhea for the past 1 week.  Patient reports initially having significant watery stools with abdominal cramping that was relieved with bowel movements. He does endorse taking Imodium with some relief, however has not taken any since 3 days ago. Patient attempted to complete an E-visit as diarrhea has returned in the form of loose stools, with up to 3-4 bowel movements yesterday however was advised to follow-up in urgent care for physical exam. Patient denies any cold symptoms, any ill contacts with similar symptoms or recent travel.  Reports an appropriate appetite, having breakfast this morning and continues to  be able to hydrate well with no nausea or emesis symptoms.  Denies any fevers or chills.    History obtained from the patient.    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  2011-05: Tibialis posterior tendonitis  Arthritis  High cholesterol      Past Medical History:   Diagnosis Date     Arthritis      Contracted, tendon 4/1/2021    Added automatically from request for surgery [id number]      Hammertoe of left foot 4/1/2021    Added automatically from request for surgery [id number]      High cholesterol      Plantar flexed metatarsal bone of left foot 4/1/2021    Added automatically from request for surgery [id number]      Tibialis posterior tendonitis 5/18/2011       Social History     Tobacco Use     Smoking status: Never Smoker     Smokeless tobacco: Never Used   Substance Use Topics     Alcohol use: Yes       Review of Systems   Constitutional: Negative for activity change, appetite change, chills, fatigue and fever.   HENT: Negative.    Respiratory: Negative.    Gastrointestinal: Positive for diarrhea. Negative for abdominal distention, abdominal pain, nausea and vomiting.   Genitourinary: Negative for dysuria.       Vitals:    03/29/22 1029   BP: (!) 149/94   BP Location: Right arm   Patient Position: Sitting   Cuff Size: Adult Large   Pulse: 61   Resp: 20   Temp: 98  F (36.7  C)   TempSrc: Oral   SpO2: 97%   Weight: 83.3 kg (183 lb 11.2 oz)       Physical Exam  Constitutional:       Appearance: Normal appearance. He is not ill-appearing or diaphoretic.   Cardiovascular:      Rate and Rhythm: Normal rate and regular rhythm.      Pulses: Normal pulses.      Heart sounds: Normal heart sounds.   Pulmonary:      Effort: Pulmonary effort is normal.      Breath sounds: Normal breath sounds.   Abdominal:      General: Bowel sounds are  normal. There is no distension.      Palpations: Abdomen is soft. There is no mass.      Tenderness: There is no abdominal tenderness. There is no right CVA tenderness, left CVA tenderness, guarding or rebound.   Skin:     General: Skin is warm.      Capillary Refill: Capillary refill takes less than 2 seconds.      Findings: No rash.   Neurological:      Mental Status: He is alert and oriented to person, place, and time.   Psychiatric:         Behavior: Behavior normal.         Labs:  No results found for any visits on 03/29/22.      At the end of the encounter, I discussed results, diagnosis, medications. Discussed red flags for immediate return to clinic/ER, as well as indications for follow up if no improvement. Patient understood and agreed to plan.     MAIRA Whittaker CNP

## 2022-03-31 ENCOUNTER — APPOINTMENT (OUTPATIENT)
Dept: LAB | Facility: CLINIC | Age: 67
End: 2022-03-31
Payer: COMMERCIAL

## 2022-03-31 PROCEDURE — 87506 IADNA-DNA/RNA PROBE TQ 6-11: CPT | Performed by: NURSE PRACTITIONER

## 2022-04-19 ENCOUNTER — OFFICE VISIT (OUTPATIENT)
Dept: SLEEP MEDICINE | Facility: CLINIC | Age: 67
End: 2022-04-19

## 2022-04-19 ENCOUNTER — IMMUNIZATION (OUTPATIENT)
Dept: NURSING | Facility: CLINIC | Age: 67
End: 2022-04-19
Payer: COMMERCIAL

## 2022-04-19 DIAGNOSIS — G47.30 SLEEP APNEA, UNSPECIFIED TYPE: ICD-10-CM

## 2022-04-19 DIAGNOSIS — R53.81 MALAISE AND FATIGUE: ICD-10-CM

## 2022-04-19 DIAGNOSIS — R06.83 SNORING: ICD-10-CM

## 2022-04-19 DIAGNOSIS — R06.89 DYSPNEA AND RESPIRATORY ABNORMALITY: ICD-10-CM

## 2022-04-19 DIAGNOSIS — Z72.820 LACK OF ADEQUATE SLEEP: ICD-10-CM

## 2022-04-19 DIAGNOSIS — R06.00 DYSPNEA AND RESPIRATORY ABNORMALITY: ICD-10-CM

## 2022-04-19 DIAGNOSIS — R53.83 MALAISE AND FATIGUE: ICD-10-CM

## 2022-04-19 PROCEDURE — 91305 COVID-19,PF,PFIZER (12+ YRS): CPT

## 2022-04-19 PROCEDURE — 0054A COVID-19,PF,PFIZER (12+ YRS): CPT

## 2022-04-20 ENCOUNTER — DOCUMENTATION ONLY (OUTPATIENT)
Dept: SLEEP MEDICINE | Facility: CLINIC | Age: 67
End: 2022-04-20
Payer: COMMERCIAL

## 2022-04-21 RX ORDER — ZOLPIDEM TARTRATE 5 MG/1
TABLET ORAL
Qty: 1 TABLET | Refills: 0 | Status: SHIPPED | OUTPATIENT
Start: 2022-04-21 | End: 2022-09-12

## 2022-04-21 NOTE — NURSING NOTE
Kamaljit Rosario sent to [facility], Hugh did his HST yesterday. Unfortunately, the device did not record, so he has to do it again. He had a prescription for one 5mg Zolpiem for the sleep study. He was wondering if you could put in an order for another pill so he can repeat the HST next week.     Thank You,     Kamaljit Isaacs 5 mg sent to [facility] on [facility] in [place].

## 2022-04-26 ENCOUNTER — TRANSFERRED RECORDS (OUTPATIENT)
Dept: HEALTH INFORMATION MANAGEMENT | Facility: CLINIC | Age: 67
End: 2022-04-26
Payer: COMMERCIAL

## 2022-04-27 ENCOUNTER — TELEPHONE (OUTPATIENT)
Dept: SLEEP MEDICINE | Facility: CLINIC | Age: 67
End: 2022-04-27
Payer: COMMERCIAL

## 2022-04-27 NOTE — TELEPHONE ENCOUNTER
Called ot let him know appt was r/s due to HST redo. Pt has no questions or concerns, info will be available in  Mychart.    Markos Walls, Visit facilitator

## 2022-04-27 NOTE — PROGRESS NOTES
The HST was returned but did not record properly.  Patient is scheduled to repeat the test. Staff was notified test is a redo and charges were deleted.

## 2022-05-03 ENCOUNTER — OFFICE VISIT (OUTPATIENT)
Dept: SLEEP MEDICINE | Facility: CLINIC | Age: 67
End: 2022-05-03
Payer: COMMERCIAL

## 2022-05-03 DIAGNOSIS — R06.83 SNORING: Primary | ICD-10-CM

## 2022-05-03 PROCEDURE — G0399 HOME SLEEP TEST/TYPE 3 PORTA: HCPCS | Performed by: INTERNAL MEDICINE

## 2022-05-04 ENCOUNTER — DOCUMENTATION ONLY (OUTPATIENT)
Dept: SLEEP MEDICINE | Facility: CLINIC | Age: 67
End: 2022-05-04
Payer: COMMERCIAL

## 2022-05-06 NOTE — PROGRESS NOTES
This HSAT was performed using a Noxturnal T3 device which recorded snore, sound, movement activity, body position, nasal pressure, oronasal thermal airflow, pulse, oximetry and both chest and abdominal respiratory effort. HSAT data was restricted to the time patient states they were in bed.     HSAT was scored using 1B 4% hypopnea rule.     HST AHI (Non-PAT): 14  Snoring was reported as loud.  Time with SpO2 below 89% was 71 minutes.   Overall signal quality was good.     Pt will follow up with sleep provider to determine appropriate therapy.

## 2022-05-06 NOTE — PROGRESS NOTES
HST POST-STUDY QUESTIONNAIRE    1. What time did you go to bed?  10:40  2. How long do you think it took to fall asleep?  20 mins  3. What time did you wake up to start the day?  6:42  4. Did you get up during the night at all?  yes  5. If you woke up, do you remember approximately what time(s)? 12:27, 4:30  6. Did you have any difficulty with the equipment?  No  7. Did you us any type of treatment with this study?  Other yes  8. Was the head of the bed elevated? No  9. Did you sleep in a recliner?  No  10. Did you stop using CPAP at least 3 days before this test?  NA  11. Any other information you'd like us to know? No

## 2022-05-08 PROBLEM — K63.5 POLYP OF COLON: Status: ACTIVE | Noted: 2018-03-20

## 2022-05-10 NOTE — PROCEDURES
"HOME SLEEP STUDY INTERPRETATION    Patient: Juan Carlos Helton  MRN: [id number]  YOB: 1955  Study Date: 5/3/2022  PCP/Referring Provider: uJarez Chamberlain;  Ordering Provider: Ely Martinez PA-C     Indications for Home Study: Juan Carlos Helton is a 67 year old male who presents with symptoms suggestive of obstructive sleep apnea.    Estimated body mass index is 25.62 kg/m  as calculated from the following:    Height as of 1/26/22: 1.803 m (5' 11\").    Weight as of 3/29/22: 83.3 kg (183 lb 11.2 oz).  Total score - Van Voorhis: 3 (1/21/2022 10:21 AM)  StopBang Total Score: 5 (2/22/2022 10:00 AM)    Data: A full night home sleep study was performed recording the standard physiologic parameters including body position, movement, sound, nasal pressure, thermal oral airflow, chest and abdominal movements with respiratory inductance plethysmography, and oxygen saturation by pulse oximetry. Pulse rate was estimated by oximetry recording. This study was considered adequate based on > 4 hours of quality oximetry and respiratory recording. As specified by the AASM Manual for the Scoring of Sleep and Associated events, version 2.3, Rule VIII.D 1B, 4% oxygen desaturation scoring for hypopneas is used as a standard of care on all home sleep apnea testing.    Analysis Time:  472 minutes    Respiration:   Sleep Associated Hypoxemia: sustained hypoxemia was present. Baseline oxygen saturation was 92%.  Time with saturation less than or equal to 88% was 71 minutes. The lowest oxygen saturation was 78%.   Snoring: Snoring was present.  Respiratory events: The home study revealed a presence of 45 obstructive apneas and 0 mixed and central apneas. There were 64 hypopneas resulting in a combined apnea/hypopnea index [AHI] of 14 events per hour.  AHI was 17 per hour supine, n/a per hour prone, 1 per hour on left side, and 12 per hour on right side.   Pattern: Excluding events noted above, respiratory rate and pattern was " Normal.    Position: Percent of time spent: supine - 64%, prone - 0%, on left - 13%, on right - 22%.    Heart Rate: By pulse oximetry normal rate was noted.     Assessment:   Mild obstructive sleep apnea.  Sleep associated hypoxemia was present and hypoventilation was suggested.    Recommendations:  Polysomnogram with transcutaneous C02 monitoring and all-night titration of PAP  Consider auto-CPAP at 5-15 cmH2O or oral appliance therapy with follow-up oximetry or HSAT on treatment  Suggest optimizing sleep hygiene and avoiding sleep deprivation.  Weight management.    Diagnosis Code(s): Obstructive Sleep Apnea G47.33, Hypoxemia G47.36    Gigi Major MD, May 10, 2022   Diplomate, American Board of Internal Medicine, Sleep Medicine

## 2022-05-10 NOTE — PROGRESS NOTES
SpO2 Readings from Last 5 Encounters:   03/29/22 97%   05/26/21 98%   05/18/21 98%   05/10/21 97%   04/26/21 99%

## 2022-05-11 ENCOUNTER — TELEPHONE (OUTPATIENT)
Dept: PODIATRY | Facility: CLINIC | Age: 67
End: 2022-05-11
Payer: COMMERCIAL

## 2022-05-11 DIAGNOSIS — M21.6X2 PLANTAR FLEXED METATARSAL BONE OF LEFT FOOT: Primary | ICD-10-CM

## 2022-07-20 ASSESSMENT — SLEEP AND FATIGUE QUESTIONNAIRES
HOW LIKELY ARE YOU TO NOD OFF OR FALL ASLEEP WHILE SITTING AND READING: SLIGHT CHANCE OF DOZING
HOW LIKELY ARE YOU TO NOD OFF OR FALL ASLEEP WHILE SITTING AND TALKING TO SOMEONE: WOULD NEVER DOZE
HOW LIKELY ARE YOU TO NOD OFF OR FALL ASLEEP WHILE LYING DOWN TO REST IN THE AFTERNOON WHEN CIRCUMSTANCES PERMIT: HIGH CHANCE OF DOZING
HOW LIKELY ARE YOU TO NOD OFF OR FALL ASLEEP WHILE SITTING INACTIVE IN A PUBLIC PLACE: WOULD NEVER DOZE
HOW LIKELY ARE YOU TO NOD OFF OR FALL ASLEEP IN A CAR, WHILE STOPPED FOR A FEW MINUTES IN TRAFFIC: WOULD NEVER DOZE
HOW LIKELY ARE YOU TO NOD OFF OR FALL ASLEEP WHILE WATCHING TV: SLIGHT CHANCE OF DOZING
HOW LIKELY ARE YOU TO NOD OFF OR FALL ASLEEP WHILE SITTING QUIETLY AFTER LUNCH WITHOUT ALCOHOL: SLIGHT CHANCE OF DOZING
HOW LIKELY ARE YOU TO NOD OFF OR FALL ASLEEP WHEN YOU ARE A PASSENGER IN A CAR FOR AN HOUR WITHOUT A BREAK: WOULD NEVER DOZE

## 2022-07-25 ENCOUNTER — VIRTUAL VISIT (OUTPATIENT)
Dept: SLEEP MEDICINE | Facility: CLINIC | Age: 67
End: 2022-07-25
Payer: COMMERCIAL

## 2022-07-25 VITALS — HEIGHT: 72 IN | WEIGHT: 186 LBS | BODY MASS INDEX: 25.19 KG/M2

## 2022-07-25 DIAGNOSIS — G47.33 OSA (OBSTRUCTIVE SLEEP APNEA): Primary | ICD-10-CM

## 2022-07-25 PROCEDURE — 99213 OFFICE O/P EST LOW 20 MIN: CPT | Mod: GT | Performed by: PHYSICIAN ASSISTANT

## 2022-07-25 NOTE — PATIENT INSTRUCTIONS
[facility]  Search engine: https://mms.aadsm.org/members/directory/search_bootstrap.php?org_id=ADSM&   Certified in Dental Sleep Medicine    Irving Stephenson  Degree: DDS  [address]  [address]  [place] [address]  Professional Phone: [phone]  Website: [email or web address]    Dave Mullen  Degree: DDS  [facility]  [address]  [address]  [place] [address]  Professional Phone: [phone]: [phone]    Amalia Caldwell  [facility]  [address]  [place] [address]  Professional Phone: [phone]  Website: [email or web address][email or web address]      Atul Ponce  Degree: DDS  [address]  [address]  [place] [address]  Professional Phone: [phone]  Fax: [phone]    Freddie Alonso  Degree: DDS  [facility]  [address]  [place] [address]  Professional Phone: [phone]  Website: [email or web address]      [facility]  [facility]  [address]  [address]  [place] [address]  Professional Phone: [phone]  Website: [email or web address]      Quita Palma  Degree: DDS  [facility]  [address]  [address]  [place] [address]  Professional Phone: [phone]     Nubia Duran  Degree: DDS, PhD  [facility]  [address] [place] [address] [id number] [place] [address]   [address],   [address]   [place] [address]   Appointments: [phone]   Fax: [phone]   [facility]   [address]   [address]   [place] [address]   Appointments: [phone]   Fax: [phone]                Kulwinder Dolan  Degree: DDS  [address] [place] [address]  Professional Phone: [phone]  Fax: [phone]  Website: [email or web address]      Diazdarrian Munoz  Degree: GERRY  [facility]  [address] [place] [address]    Prettyona Mulet Pradera  Degree: MS GERRY  [facility]  [address] [place] [address]  Professional Phone: [phone]      Chayo Harrell  Degree: MS GERRY  [facility]  [address]  [address]  [place] [address]  Professional Phone: [phone]    Whitney Mcconnell  Degree: GERRY  [facility]  [address]  [address]  [place] [address]  [place] Office     Obie Caraballo  Degree: GERRY  [facility]  [address] [address]  Professional Phone: [phone]  Website: [email or web address]      Apollo Duque  Degree: GERRY  [facility]  [address] [place] [address]  Professional Phone: [phone]  Fax: [phone]      Casey Quiroga  Degree: GERRY [facility]  [address]  [address]  [place] [address]                 ACCEPT MEDICARE  Nabor Winchester DDS  [address], [place] [address]  [phone]; [phone] (fax)  [employer]    Mike Dang DDS, MS   [facility]   [address]   [address]   [place] [address]   Appointments: [phone]   Fax: [phone]       ADDITIONAL PROVIDERS  Bandar Cuevas DDS   [facility]   [address],   [address]   [place] [address]   Appointments: [phone]   Fax: [phone]       Landon Barron DDS, MS   [facility]  [address]  [place] [address]   Appointments: [phone] Ext: [phone]  Fax: [phone]   [email or web address]

## 2022-07-25 NOTE — PROGRESS NOTES
Juan Carlos is a 67 year old who is being evaluated via a billable video visit.      How would you like to obtain your AVS? MyChart  If the video visit is dropped, the invitation should be resent by: Send to e-mail at: [email or web address]  Will anyone else be joining your video visit? Ruby Kaminski        Video-Visit Details    Video Start Time: 10:00 AM    Type of service:  Video Visit    Video End Time:10:15 AM    Originating Location (pt. Location): Home    Distant Location (provider location):  [facility]     Platform used for Video Visit: MicroSolar     Home Sleep Apnea Testing Results Visit:    Chief Complaint   Patient presents with     sleep study follow up       Juan Carlos Helton is a 67 year old male who returns to [facility] after having had Home Sleep Apnea Testing.  He presented with loud snoring, witnessed apnea, bruxism, daytime fatigue/sleepiness (ESS 3) and difficulty maintaining sleep.  The STOP-BANG score is 5/8.    Home Sleep Apnea Testing - 5/3/22: 183 lbs 0 oz: AHI 14/hr. Supine AHI 17/hr.   Oxygen Umer of 78%.  Baseline 92%.  Sp02 =< 88% for 71 minutes  He slept on his back (64%), prone (0%), left (13%) and right (22%) sides.   Analysis time: 472 minutes.     Signal quality of Oxymeter 99.7% Good  Nasal Cannula 100% Good  RIP belts 100% Good.     Juan Carlos Helton reports that he slept Fair.     Home Sleep Apnea Testing was reviewed in detail today with Juan Carlos and a copy given to him for his records.    Past medical/surgical history, family history, social history, medications and allergies were reviewed.      Ht 1.829 m (6')   Wt 84.4 kg (186 lb)   BMI 25.23 kg/m      Impression/Plan:  Mild Obstructive Sleep Apnea.   Sleep associated hypoxemia was present and and hypoventilation was suggested     Treatment options discussed today including  auto-CPAP at 6-15 cmH2O, oral appliance therapy, polysomnography with full night PAP titration or  surgical options.    Elected treatment with oral appliance therapy.  Will recheck sleep apnea once the patient is established on treatment.     25 minutes spent on day of encounter doing chart review,  history and exam, counseling, coordinating plan of care, documentation and further activities as noted above.    Ely Martinez PA-C

## 2022-07-29 ENCOUNTER — TRANSFERRED RECORDS (OUTPATIENT)
Dept: HEALTH INFORMATION MANAGEMENT | Facility: CLINIC | Age: 67
End: 2022-07-29

## 2022-07-29 NOTE — PROGRESS NOTES
Called to get patient schedule for a 4 month follow up with provider. Was unable to reach lvm.    Truman ARMSTRONGA

## 2022-09-06 ENCOUNTER — MYC MEDICAL ADVICE (OUTPATIENT)
Dept: SLEEP MEDICINE | Facility: CLINIC | Age: 67
End: 2022-09-06

## 2022-09-06 ASSESSMENT — ENCOUNTER SYMPTOMS
PARESTHESIAS: 0
EYE PAIN: 0
SHORTNESS OF BREATH: 0
HEARTBURN: 0
MYALGIAS: 0
HEADACHES: 0
PALPITATIONS: 0
NERVOUS/ANXIOUS: 0
DYSURIA: 0
HEMATOCHEZIA: 0
WEAKNESS: 0
DIARRHEA: 0
JOINT SWELLING: 0
HEMATURIA: 0
CHILLS: 0
DIZZINESS: 0
CONSTIPATION: 0
ARTHRALGIAS: 0
FREQUENCY: 0
SORE THROAT: 0
COUGH: 0
ABDOMINAL PAIN: 0
FEVER: 0
NAUSEA: 0

## 2022-09-06 ASSESSMENT — ACTIVITIES OF DAILY LIVING (ADL): CURRENT_FUNCTION: NO ASSISTANCE NEEDED

## 2022-09-07 PROBLEM — K44.9 HIATAL HERNIA: Status: ACTIVE | Noted: 2022-09-07

## 2022-09-11 PROBLEM — K21.00 GASTROESOPHAGEAL REFLUX DISEASE WITH ESOPHAGITIS WITHOUT HEMORRHAGE: Status: ACTIVE | Noted: 2019-06-17

## 2022-09-12 ENCOUNTER — OFFICE VISIT (OUTPATIENT)
Dept: FAMILY MEDICINE | Facility: CLINIC | Age: 67
End: 2022-09-12
Payer: COMMERCIAL

## 2022-09-12 VITALS
HEIGHT: 71 IN | SYSTOLIC BLOOD PRESSURE: 132 MMHG | HEART RATE: 56 BPM | BODY MASS INDEX: 26.88 KG/M2 | RESPIRATION RATE: 12 BRPM | WEIGHT: 192 LBS | DIASTOLIC BLOOD PRESSURE: 80 MMHG

## 2022-09-12 DIAGNOSIS — Z13.0 SCREENING FOR ENDOCRINE, NUTRITIONAL, METABOLIC AND IMMUNITY DISORDER: ICD-10-CM

## 2022-09-12 DIAGNOSIS — Z13.21 SCREENING FOR ENDOCRINE, NUTRITIONAL, METABOLIC AND IMMUNITY DISORDER: ICD-10-CM

## 2022-09-12 DIAGNOSIS — Z12.5 SCREENING FOR PROSTATE CANCER: ICD-10-CM

## 2022-09-12 DIAGNOSIS — Z13.29 SCREENING FOR ENDOCRINE, NUTRITIONAL, METABOLIC AND IMMUNITY DISORDER: ICD-10-CM

## 2022-09-12 DIAGNOSIS — Z00.00 ENCOUNTER FOR MEDICARE ANNUAL WELLNESS EXAM: Primary | ICD-10-CM

## 2022-09-12 DIAGNOSIS — Z13.228 SCREENING FOR ENDOCRINE, NUTRITIONAL, METABOLIC AND IMMUNITY DISORDER: ICD-10-CM

## 2022-09-12 LAB
ALBUMIN SERPL BCG-MCNC: 4.4 G/DL (ref 3.5–5.2)
ALP SERPL-CCNC: 64 U/L (ref 40–129)
ALT SERPL W P-5'-P-CCNC: 17 U/L (ref 10–50)
ANION GAP SERPL CALCULATED.3IONS-SCNC: 7 MMOL/L (ref 7–15)
AST SERPL W P-5'-P-CCNC: 35 U/L (ref 10–50)
BILIRUB SERPL-MCNC: 0.6 MG/DL
BUN SERPL-MCNC: 15.5 MG/DL (ref 8–23)
CALCIUM SERPL-MCNC: 9.5 MG/DL (ref 8.8–10.2)
CHLORIDE SERPL-SCNC: 100 MMOL/L (ref 98–107)
CHOLEST SERPL-MCNC: 216 MG/DL
CREAT SERPL-MCNC: 0.83 MG/DL (ref 0.67–1.17)
DEPRECATED HCO3 PLAS-SCNC: 27 MMOL/L (ref 22–29)
GFR SERPL CREATININE-BSD FRML MDRD: >90 ML/MIN/1.73M2
GLUCOSE SERPL-MCNC: 100 MG/DL (ref 70–99)
HDLC SERPL-MCNC: 63 MG/DL
LDLC SERPL CALC-MCNC: 141 MG/DL
NONHDLC SERPL-MCNC: 153 MG/DL
POTASSIUM SERPL-SCNC: 4.6 MMOL/L (ref 3.4–5.3)
PROT SERPL-MCNC: 7.5 G/DL (ref 6.4–8.3)
PSA SERPL-MCNC: 0.9 NG/ML (ref 0–4.5)
SODIUM SERPL-SCNC: 134 MMOL/L (ref 136–145)
TRIGL SERPL-MCNC: 62 MG/DL

## 2022-09-12 PROCEDURE — 90472 IMMUNIZATION ADMIN EACH ADD: CPT | Performed by: FAMILY MEDICINE

## 2022-09-12 PROCEDURE — G0103 PSA SCREENING: HCPCS | Performed by: FAMILY MEDICINE

## 2022-09-12 PROCEDURE — 90471 IMMUNIZATION ADMIN: CPT | Performed by: FAMILY MEDICINE

## 2022-09-12 PROCEDURE — 36415 COLL VENOUS BLD VENIPUNCTURE: CPT | Performed by: FAMILY MEDICINE

## 2022-09-12 PROCEDURE — 90662 IIV NO PRSV INCREASED AG IM: CPT | Performed by: FAMILY MEDICINE

## 2022-09-12 PROCEDURE — 83695 ASSAY OF LIPOPROTEIN(A): CPT | Performed by: FAMILY MEDICINE

## 2022-09-12 PROCEDURE — 80061 LIPID PANEL: CPT | Performed by: FAMILY MEDICINE

## 2022-09-12 PROCEDURE — 99397 PER PM REEVAL EST PAT 65+ YR: CPT | Mod: 25 | Performed by: FAMILY MEDICINE

## 2022-09-12 PROCEDURE — 90677 PCV20 VACCINE IM: CPT | Performed by: FAMILY MEDICINE

## 2022-09-12 PROCEDURE — 80053 COMPREHEN METABOLIC PANEL: CPT | Performed by: FAMILY MEDICINE

## 2022-09-12 ASSESSMENT — ENCOUNTER SYMPTOMS
PALPITATIONS: 0
HEMATURIA: 0
ABDOMINAL PAIN: 0
NERVOUS/ANXIOUS: 0
SORE THROAT: 0
DYSURIA: 0
NAUSEA: 0
COUGH: 0
FEVER: 0
WEAKNESS: 0
HEMATOCHEZIA: 0
DIZZINESS: 0
FREQUENCY: 0
SHORTNESS OF BREATH: 0
CHILLS: 0
CONSTIPATION: 0
JOINT SWELLING: 0
HEARTBURN: 0
PARESTHESIAS: 0
ARTHRALGIAS: 0
EYE PAIN: 0
HEADACHES: 0
MYALGIAS: 0
DIARRHEA: 0

## 2022-09-12 ASSESSMENT — ACTIVITIES OF DAILY LIVING (ADL): CURRENT_FUNCTION: NO ASSISTANCE NEEDED

## 2022-09-12 NOTE — PATIENT INSTRUCTIONS
Patient Education   Personalized Prevention Plan  You are due for the preventive services outlined below.  Your care team is available to assist you in scheduling these services.  If you have already completed any of these items, please share that information with your care team to update in your medical record.  Health Maintenance Due   Topic Date Due     AORTIC ANEURYSM SCREENING (SYSTEM ASSIGNED)  Never done     Flu Vaccine (1) 09/01/2022     Pneumococcal Vaccine (1 - PCV) 03/25/2022

## 2022-09-12 NOTE — PROGRESS NOTES
"SUBJECTIVE:   Juan Carlos Helton is a 67 year old male who presents for Preventive Visit.      Patient has been advised of split billing requirements and indicates understanding: Yes  Are you in the first 12 months of your Medicare coverage?  No    Healthy Habits:     In general, how would you rate your overall health?  Excellent    Frequency of exercise:  6-7 days/week    Duration of exercise:  45-60 minutes    Do you usually eat at least 4 servings of fruit and vegetables a day, include whole grains    & fiber and avoid regularly eating high fat or \"junk\" foods?  Yes    Medication side effects:  None    Ability to successfully perform activities of daily living:  No assistance needed    Home Safety:  No safety concerns identified    Hearing Impairment:  No hearing concerns    In the past 6 months, have you been bothered by leaking of urine?  No    In general, how would you rate your overall mental or emotional health?  Excellent      PHQ-2 Total Score: 0    Do you feel safe in your environment? Yes    Have you ever done Advance Care Planning? (For example, a Health Directive, POLST, or a discussion with a medical provider or your loved ones about your wishes): No, advance care planning information given to patient to review.  Patient declined advance care planning discussion at this time.        Hearing Acuity: Pass    Hearing Assessment: normal   Fall risk  Fallen 2 or more times in the past year?: No  Any fall with injury in the past year?: No    Cognitive Screening   1) Repeat 3 items (Leader, Season, Table)    2) Clock draw: NORMAL  3) 3 item recall: Recalls 3 objects  Results: normal clock and 3 words recall    Mini-CogTM Copyright BELINDA Love. Licensed by the author for use in [facility]; reprinted with permission (yinka@.[facility]). All rights reserved.      Do you have sleep apnea, excessive snoring or daytime drowsiness?: yes    Reviewed and updated as needed this visit by clinical staff   Tobacco  " Allergies  Meds                Reviewed and updated as needed this visit by Provider                   Social History     Tobacco Use     Smoking status: Never Smoker     Smokeless tobacco: Never Used   Substance Use Topics     Alcohol use: Yes     If you drink alcohol do you typically have >3 drinks per day or >7 drinks per week? No    Alcohol Use 9/6/2022   Prescreen: >3 drinks/day or >7 drinks/week? No   Prescreen: >3 drinks/day or >7 drinks/week? -       PROBLEMS TO ADD ON...  Upper endoscopy last July, improvement of esophagitis, down to 40 mg of omeprazole daily.  Overall exercising regularly and daily.  Current providers sharing in care for this patient include:   Patient Care Team:  Juarez Chamberlain MD as PCP - General  Juarez Chamberlain MD as Assigned PCP    The following health maintenance items are reviewed in Epic and correct as of today:  Health Maintenance Due   Topic Date Due     AORTIC ANEURYSM SCREENING (SYSTEM ASSIGNED)  Never done     INFLUENZA VACCINE (1) 09/01/2022     ANNUAL REVIEW OF HM ORDERS  09/09/2022     Pneumococcal Vaccine: 65+ Years (1 - PCV) 03/25/2022     MEDICARE ANNUAL WELLNESS VISIT  09/09/2022     Lab work is in process  Labs reviewed in EPIC  BP Readings from Last 3 Encounters:   09/12/22 132/80   03/29/22 (!) 149/94   01/19/22 (!) 144/85    Wt Readings from Last 3 Encounters:   09/12/22 87.1 kg (192 lb)   07/25/22 84.4 kg (186 lb)   03/29/22 83.3 kg (183 lb 11.2 oz)                  Patient Active Problem List   Diagnosis     Gastroesophageal reflux disease with esophagitis without hemorrhage     Arthritis     Osteoarthritis of subtalar joint     Polyp of colon     OANH (obstructive sleep apnea)     Hiatal hernia     Past Surgical History:   Procedure Laterality Date     APPENDECTOMY       ARTHRODESIS ANKLE Right     S/P subtalar arthrodesis, PTT synoverctomy, 2 MT osteotomy, IP joint hemiresection, EDL/gastroc lengthening 8/4/2011     INGUINAL HERNIA REPAIR       REPAIR JUDY  TOE Left 4/30/2021    Procedure: CORRECTION, HAMMER TOE, left second and third toe, EXCISION, METATARSAL BONE, HEAD second and third left toes, RELEASE, TENDON, EXTENSOR second and third toe left foot;  Surgeon: Perico Neri DPM;  Location: [facility];  Service: Podiatry     REPAIR HAMMER TOE Left 4/30/2021    Procedure: RELEASE, TENDON, EXTENSOR second and third toe left foot;  Surgeon: Perico Neri DPM;  Location: [facility];  Service: Podiatry       Social History     Tobacco Use     Smoking status: Never Smoker     Smokeless tobacco: Never Used   Substance Use Topics     Alcohol use: Yes     Family History   Problem Relation Age of Onset     Cancer Mother      Heart Disease Mother      Arthritis Father      Hearing Loss Father          Current Outpatient Medications   Medication Sig Dispense Refill     ascorbic acid 1000 MG TABS tablet Take 1,000 mg by mouth       cholecalciferol 50 MCG (2000 UT) CAPS        fexofenadine (ALLEGRA) 180 MG tablet Take 180 mg by mouth (Patient not taking: No sig reported)       Fish Oil-Cholecalciferol (FISH OIL + D3 PO)        Green Tea 150 MG CAPS daily       Ibuprofen (ADVIL PO) Take  by mouth as needed. (Patient not taking: No sig reported)       Multiple Vitamins-Minerals (MULTIVITAMIN ADULT PO)        omeprazole (PRILOSEC) 40 MG DR capsule TAKE 1 CAPSULE BY MOUTH TWICE A DAY (BEFORE BREAKFAST AND DINNER) FOR ESOPHAGITIS       No Known Allergies  Recent Labs   Lab Test 09/09/21  0935 09/08/20  0937 06/17/19  0825   A1C 5.0 5.5 5.4    174* 148*   HDL 64 54 49   TRIG 101 129 187*   ALT 17 22 21   CR 0.91 0.83 0.81   GFRESTIMATED 89 >60 >60   GFRESTBLACK  --  >60 >60   POTASSIUM 4.3 4.4 4.4   TSH  --  1.92  --       Pneumonia Vaccine:For adults 65 years or older who do not have an immunocompromising condition, cerebrospinal fluid leak, or cochlear implant and want to receive PPSV23 ONLY: Administer 1 dose of PPSV23.      Review of Systems  "  Constitutional: Negative for chills and fever.   HENT: Negative for congestion, ear pain, hearing loss and sore throat.    Eyes: Negative for pain and visual disturbance.   Respiratory: Negative for cough and shortness of breath.    Cardiovascular: Negative for chest pain, palpitations and peripheral edema.   Gastrointestinal: Negative for abdominal pain, constipation, diarrhea, heartburn, hematochezia and nausea.   Genitourinary: Negative for dysuria, frequency, genital sores, hematuria, impotence, penile discharge and urgency.   Musculoskeletal: Negative for arthralgias, joint swelling and myalgias.   Skin: Negative for rash.   Neurological: Negative for dizziness, weakness, headaches and paresthesias.   Psychiatric/Behavioral: Negative for mood changes. The patient is not nervous/anxious.      Constitutional, HEENT, cardiovascular, pulmonary, GI, , musculoskeletal, neuro, skin, endocrine and psych systems are negative, except as otherwise noted.    OBJECTIVE:   BP (!) 149/88 (BP Location: Left arm, Patient Position: Sitting, Cuff Size: Adult Large)   Pulse 56   Resp 12   Ht 1.8 m (5' 10.87\")   Wt 87.1 kg (192 lb)   BMI 26.88 kg/m   Estimated body mass index is 26.88 kg/m  as calculated from the following:    Height as of this encounter: 1.8 m (5' 10.87\").    Weight as of this encounter: 87.1 kg (192 lb).  Physical Exam  GENERAL: healthy, alert and no distress  NECK: no adenopathy, no asymmetry, masses, or scars and thyroid normal to palpation  RESP: lungs clear to auscultation - no rales, rhonchi or wheezes  CV: regular rate and rhythm, normal S1 S2, no S3 or S4, no murmur, click or rub, no peripheral edema and peripheral pulses strong  ABDOMEN: soft, nontender, no hepatosplenomegaly, no masses and bowel sounds normal  MS: no gross musculoskeletal defects noted, no edema    Diagnostic Test Results:  Labs reviewed in Epic    ASSESSMENT / PLAN:   (Z00.00) Encounter for Medicare annual wellness exam  " "(primary encounter diagnosis)  Comment:   Plan:     (Z13.29,  Z13.21,  Z13.228,  Z13.0) Screening for endocrine, nutritional, metabolic and immunity disorder  Comment:   Plan: Lipid panel reflex to direct LDL Fasting,         Comprehensive metabolic panel (BMP + Alb, Alk         Phos, ALT, AST, Total. Bili, TP)            (Z12.5) Screening for prostate cancer  Comment:   Plan: PSA, screen         Overall doing well, continue healthy lifestyle changes, regular physical activities, currently taking omeprazole 40 mg daily for GERD, had an EGD showing improvement of esophagitis.  Mild obstructive sleep apnea, he is following up with the sleep clinic, he will  get a mouthpiece    Patient has been advised of split billing requirements and indicates understanding: Yes    COUNSELING:  Reviewed preventive health counseling, as reflected in patient instructions       Regular exercise       Healthy diet/nutrition       Vision screening       Hearing screening       Dental care       Bladder control    Estimated body mass index is 26.88 kg/m  as calculated from the following:    Height as of this encounter: 1.8 m (5' 10.87\").    Weight as of this encounter: 87.1 kg (192 lb).    Weight management plan: Discussed healthy diet and exercise guidelines    He reports that he has never smoked. He has never used smokeless tobacco.      Appropriate preventive services were discussed with this patient, including applicable screening as appropriate for cardiovascular disease, diabetes, osteopenia/osteoporosis, and glaucoma.  As appropriate for age/gender, discussed screening for colorectal cancer, prostate cancer, breast cancer, and cervical cancer. Checklist reviewing preventive services available has been given to the patient.    Reviewed patients plan of care and provided an AVS. The Intermediate Care Plan ( asthma action plan, low back pain action plan, and migraine action plan) for Juan Carlos meets the Care Plan requirement. This Care Plan " has been established and reviewed with the Patient.    Counseling Resources:  ATP IV Guidelines  Pooled Cohorts Equation Calculator  Breast Cancer Risk Calculator  Breast Cancer: Medication to Reduce Risk  FRAX Risk Assessment  ICSI Preventive Guidelines  Dietary Guidelines for Americans, 2010  USDA's MyPlate  ASA Prophylaxis  Lung CA Screening    Juarez Chamberlain MD  [facility]    Identified Health Risks:

## 2023-01-11 ENCOUNTER — MYC MEDICAL ADVICE (OUTPATIENT)
Dept: SLEEP MEDICINE | Facility: CLINIC | Age: 68
End: 2023-01-11

## 2023-01-11 NOTE — TELEPHONE ENCOUNTER
Faxed referral and sleep study interp to [facility] at patient request at  [phone] at [phone] on 1/11/23.

## 2023-01-18 ENCOUNTER — NURSE TRIAGE (OUTPATIENT)
Dept: NURSING | Facility: CLINIC | Age: 68
End: 2023-01-18
Payer: COMMERCIAL

## 2023-01-18 DIAGNOSIS — U07.1 INFECTION DUE TO 2019 NOVEL CORONAVIRUS: Primary | ICD-10-CM

## 2023-01-18 NOTE — TELEPHONE ENCOUNTER
RN COVID TREATMENT VISIT  01/18/23    Juan Carlos Helton  67 year old  Current weight? 195 lbs    Has the patient been seen by a primary care provider at an [facility] or [facility] within the past two years? Yes.   Have you been in close proximity to/do you have a known exposure to a person with a confirmed case of influenza? No.     Date of positive COVID test (PCR or at home)?  1/18/2023    Current COVID symptoms: shortness of breath or difficulty breathing, muscle or body aches and congestion or runny nose    Date COVID symptoms began: 1/18/2023    Do you have any of the following conditions that place you at risk of being very sick from COVID-19? 65 years or older and overweight (BMI>25)    Is patient eligible to continue? Yes, established patient, 12 years or older weighing at least 88.2 lbs, who has COVID symptoms that started in the past 5 days and is at risk for being very sick from COVID-19.       Have you received monoclonal antibodies or oral antiviral medications since testing positive to COVID-19? No    Are you currently hospitalized for COVID-19? No    Do you have a history of hepatitis? No    Are you currently pregnant or nursing? No    Do you have a clinically significant hypersensitivity to nirmatrelvir, ritonavir, or molnupiravir? No    Do you have any history of severe renal impairment (eGFR < 30mL/min)? No    Do you have any history of hepatic impairment or abnormalities (e.g. hepatic panel, ALT, AST, ALK Phos, bilirubin)? No    Have you had a coronary stent placed in the previous 6 months? No    Is patient eligible to continue?   Yes, patient meets all eligibility requirements for the RN COVID treatment (as denoted by all no responses above).     Current Outpatient Medications   Medication Sig Dispense Refill     ascorbic acid 1000 MG TABS tablet Take 1,000 mg by mouth       cholecalciferol 50 MCG (2000 UT) CAPS        fexofenadine (ALLEGRA) 180 MG tablet Take 180 mg by  mouth (Patient not taking: No sig reported)       Fish Oil-Cholecalciferol (FISH OIL + D3 PO)        Green Tea 150 MG CAPS daily       Ibuprofen (ADVIL PO) Take  by mouth as needed. (Patient not taking: No sig reported)       Multiple Vitamins-Minerals (MULTIVITAMIN ADULT PO)        omeprazole (PRILOSEC) 40 MG DR capsule TAKE 1 CAPSULE BY MOUTH TWICE A DAY (BEFORE BREAKFAST AND DINNER) FOR ESOPHAGITIS         Medications from List 1 of the standing order (on medications that exclude the use of Paxlovid) that patient is taking: NONE. Is patient taking [place]'s Wort? No  Is patient taking [place]'s Wort or any meds from List 1? No.   Medications from List 2 of the standing order (on meds that provider needs to adjust) that patient is taking: NONE. Is patient on any of the meds from List 2? No.   Medications from List 3 of standing order (on meds that a RN needs to adjust) that patient is taking: NONE. Is patient on any meds from List 3? No.   In order of efficacy, Paxlovid has an approximate 90% reduction in hospitalization. Paxlovid can possibly cause altered sense of taste, diarrhea (loose, watery stools), high blood pressure, muscle aches.  The other option is molnupiravir which has an approximate 30% reduction in hospitalization. Molnupirarivr can possibly cause diarrhea (loose, watery stools), nausea (feeling sick to your stomach), dizziness, headaches.    Which treatment option does the patient prefer?   Paxlovid.   Lab Results   Component Value Date    GFRESTIMATED >90 09/12/2022       Was last eGFR reduced? No, eGFR 60 or greater/ No Result on record. Patient can receive the normal renal function dose. Paxlovid Rx sent to [facility]       Temporary change to home medications: None    All medication adjustments (holds, etc) were discussed with the patient and patient was asked to repeat back (teachback) their med adjustment.  Did patient understand med adjustment? Yes, patient repeated back and  understood correctly.        Reviewed the following instructions with the patient:    Paxlovid (nimatrelvir and ritonavir)    How it works  Two medicines (nirmatrelvir and ritonavir) are taken together. They stop the virus from growing. Less amount of virus is easier for your body to fight.    How to take    Medicine comes in a daily container with both medicine tablets. Take by mouth twice daily (once in the morning, once at night) for 5 days.    The number of tablets to take varies by patient.    Don't chew or break capsules. Swallow whole.    When to take  Take as soon as possible after positive COVID-19 test result, and within 5 days of your first symptoms.    Possible side effects  Can cause altered sense of taste, diarrhea (loose, watery stools), high blood pressure, muscle aches.    Leonor Ledesma RN      Reason for Disposition    HIGH RISK for severe COVID complications (e.g., weak immune system, age > 64 years, obesity with BMI > 25, pregnant, chronic lung disease or other chronic medical condition) (Exception: Already seen by PCP and no new or worsening symptoms.)    Additional Information    Negative: SEVERE difficulty breathing (e.g., struggling for each breath, speaks in single words)    Negative: Difficult to awaken or acting confused (e.g., disoriented, slurred speech)    Negative: Bluish (or gray) lips or face now    Negative: Shock suspected (e.g., cold/pale/clammy skin, too weak to stand, low BP, rapid pulse)    Negative: Sounds like a life-threatening emergency to the triager    Negative: [1] Diagnosed or suspected COVID-19 AND [2] symptoms lasting 3 or more weeks    Negative: [1] COVID-19 exposure AND [2] no symptoms    Negative: COVID-19 vaccine reaction suspected (e.g., fever, headache, muscle aches) occurring 1 to 3 days after getting vaccine    Negative: COVID-19 vaccine, questions about    Negative: [1] Lives with someone known to have influenza (flu test positive) AND [2] flu-like symptoms  (e.g., cough, runny nose, sore throat, SOB; with or without fever)    Negative: [1] Adult with possible COVID-19 symptoms AND [2] triager concerned about severity of symptoms or other causes    Negative: COVID-19 and breastfeeding, questions about    Negative: SEVERE or constant chest pain or pressure  (Exception: Mild central chest pain, present only when coughing.)    Negative: MODERATE difficulty breathing (e.g., speaks in phrases, SOB even at rest, pulse 100-120)    Negative: Headache and stiff neck (can't touch chin to chest)    Negative: Oxygen level (e.g., pulse oximetry) 90 percent or lower    Negative: Chest pain or pressure    Negative: Patient sounds very sick or weak to the triager    Negative: MILD difficulty breathing (e.g., minimal/no SOB at rest, SOB with walking, pulse <100)    Negative: Fever > 103 F (39.4 C)    Negative: [1] Fever > 101 F (38.3 C) AND [2] over 60 years of age    Negative: [1] Fever > 100.0 F (37.8 C) AND [2] bedridden (e.g., nursing home patient, CVA, chronic illness, recovering from surgery)    Protocols used: CORONAVIRUS (COVID-19) DIAGNOSED OR [id number] 1.18.2022

## 2023-02-03 ENCOUNTER — OFFICE VISIT (OUTPATIENT)
Dept: URGENT CARE | Facility: URGENT CARE | Age: 68
End: 2023-02-03
Payer: COMMERCIAL

## 2023-02-03 VITALS
TEMPERATURE: 97.9 F | OXYGEN SATURATION: 97 % | DIASTOLIC BLOOD PRESSURE: 79 MMHG | HEART RATE: 62 BPM | HEIGHT: 70 IN | BODY MASS INDEX: 27.49 KG/M2 | SYSTOLIC BLOOD PRESSURE: 135 MMHG | WEIGHT: 192 LBS

## 2023-02-03 DIAGNOSIS — U09.9 POST-COVID CHRONIC COUGH: Primary | ICD-10-CM

## 2023-02-03 DIAGNOSIS — R05.3 POST-COVID CHRONIC COUGH: Primary | ICD-10-CM

## 2023-02-03 PROCEDURE — 99213 OFFICE O/P EST LOW 20 MIN: CPT | Mod: CS | Performed by: NURSE PRACTITIONER

## 2023-02-03 NOTE — PROGRESS NOTES
"Chief Complaint   Patient presents with     Urgent Care     Pt had covid 4 weeks ago, after medication pt started having cough, morning coughing is frequent, stuffy          ICD-10-CM    1. Post-COVID chronic cough  R05.3     U09.9       Patient reports he is sleeping well at night and the cough is more of a nuisance.  If he develops new shortness of breath or fever he will return for further evaluation.    Subjective     Juan Carlos Helton is an 67 year old male who presents to clinic today for cough that has been persistent since he had COVID-19 a month ago.  He did receive Paxil Ash treatment and actually was feeling quite a bit better but the day after he finished the medication his symptoms worsened again considerably and he felt worse than he had in the first place.  Over the last couple weeks symptoms have improved but the cough has been persistent.    ROS: 10 point ROS neg other than the symptoms noted above in the HPI.       Objective    /79 (BP Location: Left arm, Patient Position: Sitting, Cuff Size: Adult Large)   Pulse 62   Temp 97.9  F (36.6  C) (Oral)   Ht 1.778 m (5' 10\")   Wt 87.1 kg (192 lb)   SpO2 97%   BMI 27.55 kg/m    Nurses notes and VS have been reviewed.    Physical Exam       GENERAL APPEARANCE: healthy appearing, alert     EYES: PERRL, EOMI, sclera non-icteric     HENT: oral exam benign, mucus membranes intact, without ulcers or lesions     NECK: no adenopathy or asymmetry, thyroid normal to palpation     RESP: lungs clear to auscultation - no rales, rhonchi or wheezes     CV: regular rates and rhythm, no murmurs, rubs, or gallop     MS: extremities normal- no gross deformities noted; normal muscle tone.     SKIN: no suspicious lesions or rashes    Patient Instructions   Drink plenty of water.    Delsym for cough if needed.      Evelin Helms APRN, CNP  [facility] Provider    The use of Dragon/Zhongjia MRO dictation services may have been used to construct the content in " this note; any grammatical or spelling errors are non-intentional. Please contact the author of this note directly if you are in need of any clarification.

## 2023-02-06 ENCOUNTER — TRANSFERRED RECORDS (OUTPATIENT)
Dept: HEALTH INFORMATION MANAGEMENT | Facility: CLINIC | Age: 68
End: 2023-02-06
Payer: COMMERCIAL

## 2023-06-16 ENCOUNTER — TRANSFERRED RECORDS (OUTPATIENT)
Dept: HEALTH INFORMATION MANAGEMENT | Facility: CLINIC | Age: 68
End: 2023-06-16
Payer: COMMERCIAL

## 2023-06-29 ENCOUNTER — TRANSFERRED RECORDS (OUTPATIENT)
Dept: HEALTH INFORMATION MANAGEMENT | Facility: CLINIC | Age: 68
End: 2023-06-29
Payer: COMMERCIAL

## 2023-07-25 ENCOUNTER — TRANSFERRED RECORDS (OUTPATIENT)
Dept: HEALTH INFORMATION MANAGEMENT | Facility: CLINIC | Age: 68
End: 2023-07-25
Payer: COMMERCIAL

## 2023-09-07 ENCOUNTER — TRANSFERRED RECORDS (OUTPATIENT)
Dept: HEALTH INFORMATION MANAGEMENT | Facility: CLINIC | Age: 68
End: 2023-09-07
Payer: COMMERCIAL

## 2023-09-19 ENCOUNTER — LAB REQUISITION (OUTPATIENT)
Dept: LAB | Facility: CLINIC | Age: 68
End: 2023-09-19

## 2023-09-19 DIAGNOSIS — Z13.1 ENCOUNTER FOR SCREENING FOR DIABETES MELLITUS: ICD-10-CM

## 2023-09-19 DIAGNOSIS — Z12.5 ENCOUNTER FOR SCREENING FOR MALIGNANT NEOPLASM OF PROSTATE: ICD-10-CM

## 2023-09-19 DIAGNOSIS — E78.5 HYPERLIPIDEMIA, UNSPECIFIED: ICD-10-CM

## 2023-09-19 LAB
ALBUMIN SERPL BCG-MCNC: 4.5 G/DL (ref 3.5–5.2)
ALP SERPL-CCNC: 64 U/L (ref 40–129)
ALT SERPL W P-5'-P-CCNC: 16 U/L (ref 0–70)
ANION GAP SERPL CALCULATED.3IONS-SCNC: 13 MMOL/L (ref 7–15)
AST SERPL W P-5'-P-CCNC: 29 U/L (ref 0–45)
BILIRUB SERPL-MCNC: 0.7 MG/DL
BUN SERPL-MCNC: 14.8 MG/DL (ref 8–23)
CALCIUM SERPL-MCNC: 9.6 MG/DL (ref 8.8–10.2)
CHLORIDE SERPL-SCNC: 105 MMOL/L (ref 98–107)
CHOLEST SERPL-MCNC: 222 MG/DL
CREAT SERPL-MCNC: 0.79 MG/DL (ref 0.67–1.17)
DEPRECATED HCO3 PLAS-SCNC: 24 MMOL/L (ref 22–29)
EGFRCR SERPLBLD CKD-EPI 2021: >90 ML/MIN/1.73M2
GLUCOSE SERPL-MCNC: 97 MG/DL (ref 70–99)
HDLC SERPL-MCNC: 58 MG/DL
LDLC SERPL CALC-MCNC: 141 MG/DL
NONHDLC SERPL-MCNC: 164 MG/DL
POTASSIUM SERPL-SCNC: 4.6 MMOL/L (ref 3.4–5.3)
PROT SERPL-MCNC: 7.6 G/DL (ref 6.4–8.3)
PSA SERPL DL<=0.01 NG/ML-MCNC: 0.74 NG/ML (ref 0–4.5)
SODIUM SERPL-SCNC: 142 MMOL/L (ref 136–145)
TRIGL SERPL-MCNC: 113 MG/DL
TSH SERPL DL<=0.005 MIU/L-ACNC: 2.21 UIU/ML (ref 0.3–4.2)

## 2023-09-19 PROCEDURE — G0103 PSA SCREENING: HCPCS | Performed by: FAMILY MEDICINE

## 2023-09-19 PROCEDURE — 84443 ASSAY THYROID STIM HORMONE: CPT | Performed by: FAMILY MEDICINE

## 2023-09-19 PROCEDURE — 80053 COMPREHEN METABOLIC PANEL: CPT | Performed by: FAMILY MEDICINE

## 2023-09-19 PROCEDURE — 80061 LIPID PANEL: CPT | Performed by: FAMILY MEDICINE

## 2023-10-19 ENCOUNTER — TRANSFERRED RECORDS (OUTPATIENT)
Dept: HEALTH INFORMATION MANAGEMENT | Facility: CLINIC | Age: 68
End: 2023-10-19
Payer: COMMERCIAL

## 2023-11-26 ENCOUNTER — HEALTH MAINTENANCE LETTER (OUTPATIENT)
Age: 68
End: 2023-11-26

## 2024-01-27 ENCOUNTER — OFFICE VISIT (OUTPATIENT)
Dept: URGENT CARE | Facility: URGENT CARE | Age: 69
End: 2024-01-27
Payer: COMMERCIAL

## 2024-01-27 VITALS
RESPIRATION RATE: 20 BRPM | SYSTOLIC BLOOD PRESSURE: 135 MMHG | TEMPERATURE: 97.1 F | HEART RATE: 78 BPM | OXYGEN SATURATION: 97 % | DIASTOLIC BLOOD PRESSURE: 80 MMHG

## 2024-01-27 DIAGNOSIS — J40 BRONCHITIS: Primary | ICD-10-CM

## 2024-01-27 PROBLEM — Z86.0100 PERSONAL HISTORY OF COLONIC POLYPS: Status: ACTIVE | Noted: 2024-01-27

## 2024-01-27 PROCEDURE — 99213 OFFICE O/P EST LOW 20 MIN: CPT | Performed by: PHYSICIAN ASSISTANT

## 2024-01-27 RX ORDER — ALBUTEROL SULFATE 90 UG/1
2 AEROSOL, METERED RESPIRATORY (INHALATION) EVERY 4 HOURS PRN
Qty: 18 G | Refills: 0 | Status: SHIPPED | OUTPATIENT
Start: 2024-01-27

## 2024-01-27 RX ORDER — BENZONATATE 200 MG/1
200 CAPSULE ORAL 3 TIMES DAILY PRN
COMMUNITY
Start: 2024-01-21 | End: 2024-01-27

## 2024-01-27 RX ORDER — ALBUTEROL SULFATE 90 UG/1
AEROSOL, METERED RESPIRATORY (INHALATION)
COMMUNITY
Start: 2024-01-21 | End: 2024-01-27

## 2024-01-27 RX ORDER — BENZONATATE 200 MG/1
200 CAPSULE ORAL 3 TIMES DAILY PRN
Qty: 30 CAPSULE | Refills: 0 | Status: SHIPPED | OUTPATIENT
Start: 2024-01-27

## 2024-01-27 RX ORDER — DOXYCYCLINE 100 MG/1
100 CAPSULE ORAL 2 TIMES DAILY
COMMUNITY
Start: 2024-01-21

## 2024-01-28 NOTE — PROGRESS NOTES
Assessment & Plan     Bronchitis  Discussed etiology and typical treatment of Bronchitis.  He should finish the last few pills but discussed the cough, fatigue, wheezing may linger.  I am reassured by his exam with clear lungs as well as normal oxygen sat and no tachypnea.    Instructed to follow-up with pcp in 2 weeks if not resolved.    Also should seek care for fevers, chest pain, worsening symptoms rather than slowly improving.    - benzonatate (TESSALON) 200 MG capsule  Dispense: 30 capsule; Refill: 0  - albuterol (PROAIR HFA/PROVENTIL HFA/VENTOLIN HFA) 108 (90 Base) MCG/ACT inhaler  Dispense: 18 g; Refill: 0       Marguerite Cosme PA-C  [facility] [place]    Emily Rangel is a 68 year old male who presents to clinic today for the following health issues:  Chief Complaint   Patient presents with    Urgent Care    Cough     Pt was treated for bronchis on Sunday, still having symptoms - no fever     Shortness of Breath     When walking around      HPI    Started on 1-16-24. Seen on 1-21-24 when in [place]. Treated with 10 mg decadron, albuterol neb, doxycycline, prednisone, and tessalon.  Finishing last doses of prednisone and doxycycline and does not feel back to normal but is about 90 %.     Albuterol helpful for 1 hour.    No fevers.    No nausea, vomiting.    No leg swelling    No SOB at rest.   Playing pickle ball, went to gym, walking dogs, able to do so but feeling more fatigued.   No chest pain.   Nasal congestion, rhinorrhea, no facial pain or tooth pain.   No labored respirations.    Cough persists, productive at times.   Has follow-up with his primary clinic next week.      Review of Systems  Constitutional, HEENT, cardiovascular, pulmonary, gi and gu systems are negative, except as otherwise noted.    Patient Active Problem List   Diagnosis    Gastroesophageal reflux disease with esophagitis without hemorrhage    Arthritis    Osteoarthritis of subtalar joint    Polyp of  colon    OANH (obstructive sleep apnea)    Hiatal hernia    Personal history of colonic polyps     Current Outpatient Medications   Medication    albuterol (PROAIR HFA/PROVENTIL HFA/VENTOLIN HFA) 108 (90 Base) MCG/ACT inhaler    benzonatate (TESSALON) 200 MG capsule    doxycycline hyclate (VIBRAMYCIN) 100 MG capsule    Fish Oil-Cholecalciferol (FISH OIL + D3 PO)    Green Tea 150 MG CAPS    Multiple Vitamins-Minerals (MULTIVITAMIN ADULT PO)    omeprazole (PRILOSEC) 40 MG DR capsule     No current facility-administered medications for this visit.           Objective    /80   Pulse 78   Temp 97.1  F (36.2  C) (Temporal)   Resp 20   SpO2 97%   Physical Exam     Pt is in no acute distress and appears well, able to talk in full sentences.    Ears patent B:  TM s intact, non-injected. All land marks easily visibile.      Nasal mucosa is non-edematous, no discharge.    Pharynx: non erythematous, tonsils non hypertrophied, No exudate   Neck supple: no adenopathy  Lungs: CTA  Heart: RRR, no murmur, no thrills or heaves   Ext: no edema  Skin: no rashes      No results found for any visits on 01/27/24.

## 2024-02-16 ENCOUNTER — PATIENT OUTREACH (OUTPATIENT)
Dept: GASTROENTEROLOGY | Facility: CLINIC | Age: 69
End: 2024-02-16
Payer: COMMERCIAL

## 2024-10-08 ENCOUNTER — LAB REQUISITION (OUTPATIENT)
Dept: LAB | Facility: CLINIC | Age: 69
End: 2024-10-08

## 2024-10-08 DIAGNOSIS — Z13.1 ENCOUNTER FOR SCREENING FOR DIABETES MELLITUS: ICD-10-CM

## 2024-10-08 DIAGNOSIS — Z12.5 ENCOUNTER FOR SCREENING FOR MALIGNANT NEOPLASM OF PROSTATE: ICD-10-CM

## 2024-10-08 DIAGNOSIS — E78.5 HYPERLIPIDEMIA, UNSPECIFIED: ICD-10-CM

## 2024-10-08 LAB
ALBUMIN SERPL BCG-MCNC: 4.3 G/DL (ref 3.5–5.2)
ALP SERPL-CCNC: 57 U/L (ref 40–150)
ALT SERPL W P-5'-P-CCNC: 21 U/L (ref 0–70)
ANION GAP SERPL CALCULATED.3IONS-SCNC: 13 MMOL/L (ref 7–15)
AST SERPL W P-5'-P-CCNC: 33 U/L (ref 0–45)
BILIRUB SERPL-MCNC: 0.7 MG/DL
BUN SERPL-MCNC: 19.5 MG/DL (ref 8–23)
CALCIUM SERPL-MCNC: 9.3 MG/DL (ref 8.8–10.4)
CHLORIDE SERPL-SCNC: 102 MMOL/L (ref 98–107)
CHOLEST SERPL-MCNC: 237 MG/DL
CREAT SERPL-MCNC: 0.86 MG/DL (ref 0.67–1.17)
EGFRCR SERPLBLD CKD-EPI 2021: >90 ML/MIN/1.73M2
FASTING STATUS PATIENT QL REPORTED: ABNORMAL
FASTING STATUS PATIENT QL REPORTED: NORMAL
GLUCOSE SERPL-MCNC: 97 MG/DL (ref 70–99)
HCO3 SERPL-SCNC: 22 MMOL/L (ref 22–29)
HDLC SERPL-MCNC: 53 MG/DL
LDLC SERPL CALC-MCNC: 158 MG/DL
NONHDLC SERPL-MCNC: 184 MG/DL
POTASSIUM SERPL-SCNC: 4.2 MMOL/L (ref 3.4–5.3)
PROT SERPL-MCNC: 7.2 G/DL (ref 6.4–8.3)
PSA SERPL DL<=0.01 NG/ML-MCNC: 0.88 NG/ML (ref 0–4.5)
SODIUM SERPL-SCNC: 137 MMOL/L (ref 135–145)
TRIGL SERPL-MCNC: 128 MG/DL

## 2024-10-08 PROCEDURE — 80061 LIPID PANEL: CPT | Performed by: FAMILY MEDICINE

## 2024-10-08 PROCEDURE — G0103 PSA SCREENING: HCPCS | Performed by: FAMILY MEDICINE

## 2024-10-08 PROCEDURE — 80053 COMPREHEN METABOLIC PANEL: CPT | Performed by: FAMILY MEDICINE

## 2025-01-04 ENCOUNTER — HEALTH MAINTENANCE LETTER (OUTPATIENT)
Age: 70
End: 2025-01-04

## 2025-05-05 ENCOUNTER — VIRTUAL VISIT (OUTPATIENT)
Dept: SLEEP MEDICINE | Facility: CLINIC | Age: 70
End: 2025-05-05
Payer: COMMERCIAL

## 2025-05-05 VITALS — WEIGHT: 195 LBS | HEIGHT: 72 IN | BODY MASS INDEX: 26.41 KG/M2

## 2025-05-05 DIAGNOSIS — R09.02 HYPOXEMIA: ICD-10-CM

## 2025-05-05 DIAGNOSIS — G47.33 OSA (OBSTRUCTIVE SLEEP APNEA): Primary | ICD-10-CM

## 2025-05-05 PROCEDURE — 1126F AMNT PAIN NOTED NONE PRSNT: CPT | Mod: 95 | Performed by: PHYSICIAN ASSISTANT

## 2025-05-05 PROCEDURE — 98006 SYNCH AUDIO-VIDEO EST MOD 30: CPT | Performed by: PHYSICIAN ASSISTANT

## 2025-05-05 RX ORDER — ROSUVASTATIN CALCIUM 10 MG/1
TABLET, COATED ORAL
COMMUNITY
Start: 2025-03-13

## 2025-05-05 ASSESSMENT — PAIN SCALES - GENERAL: PAINLEVEL_OUTOF10: NO PAIN (0)

## 2025-05-05 ASSESSMENT — SLEEP AND FATIGUE QUESTIONNAIRES
HOW LIKELY ARE YOU TO NOD OFF OR FALL ASLEEP WHILE SITTING AND TALKING TO SOMEONE: WOULD NEVER DOZE
HOW LIKELY ARE YOU TO NOD OFF OR FALL ASLEEP WHILE LYING DOWN TO REST IN THE AFTERNOON WHEN CIRCUMSTANCES PERMIT: SLIGHT CHANCE OF DOZING
HOW LIKELY ARE YOU TO NOD OFF OR FALL ASLEEP WHILE WATCHING TV: WOULD NEVER DOZE
HOW LIKELY ARE YOU TO NOD OFF OR FALL ASLEEP WHILE SITTING QUIETLY AFTER LUNCH WITHOUT ALCOHOL: WOULD NEVER DOZE
HOW LIKELY ARE YOU TO NOD OFF OR FALL ASLEEP WHILE SITTING INACTIVE IN A PUBLIC PLACE: WOULD NEVER DOZE
HOW LIKELY ARE YOU TO NOD OFF OR FALL ASLEEP IN A CAR, WHILE STOPPED FOR A FEW MINUTES IN TRAFFIC: WOULD NEVER DOZE
HOW LIKELY ARE YOU TO NOD OFF OR FALL ASLEEP WHEN YOU ARE A PASSENGER IN A CAR FOR AN HOUR WITHOUT A BREAK: WOULD NEVER DOZE
HOW LIKELY ARE YOU TO NOD OFF OR FALL ASLEEP WHILE SITTING AND READING: WOULD NEVER DOZE

## 2025-05-05 NOTE — NURSING NOTE
Current patient location: [address]   [place] [address]    Is the patient currently in the state of [place]? YES    Visit mode: VIDEO    If the visit is dropped, the patient can be reconnected by:VIDEO VISIT: Text to cell phone:   Telephone Information:   Mobile [phone]       Will anyone else be joining the visit? NO  (If patient encounters technical issues they should call [phone]    Are changes needed to the allergy or medication list? Pt stated no changes to allergies and Pt stated no med changes    Are refills needed on medications prescribed by this physician? NO    Rooming Documentation:  Questionnaire(s) completed    Reason for visit: CARL YAÑEZF

## 2025-05-05 NOTE — PATIENT INSTRUCTIONS
Your Body mass index is 26.45 kg/m .  Weight management is a personal decision.  If you are interested in exploring weight loss strategies, the following discussion covers the approaches that may be successful. Body mass index (BMI) is one way to tell whether you are at a healthy weight, overweight, or obese. It measures your weight in relation to your height.  A BMI of 18.5 to 24.9 is in the healthy range. A person with a BMI of 25 to 29.9 is considered overweight, and someone with a BMI of 30 or greater is considered obese. More than two-thirds of American adults are considered overweight or obese.  Being overweight or obese increases the risk for further weight gain. Excess weight may lead to heart disease and diabetes.  Creating and following plans for healthy eating and physical activity may help you improve your health.  Weight control is part of healthy lifestyle and includes exercise, emotional health, and healthy eating habits. Careful eating habits lifelong are the mainstay of weight control. Though there are significant health benefits from weight loss, long-term weight loss with diet alone may be very difficult to achieve- studies show long-term success with dietary management in less than 10% of people. Attaining a healthy weight may be especially difficult to achieve in those with severe obesity. In some cases, medications, devices and surgical management might be considered.  What can you do?  If you are overweight or obese and are interested in methods for weight loss, you should discuss this with your provider.   Consider reducing daily calorie intake by 500 calories.   Keep a food journal.   Avoiding skipping meals, consider cutting portions instead.    Diet combined with exercise helps maintain muscle while optimizing fat loss. Strength training is particularly important for building and maintaining muscle mass. Exercise helps reduce stress, increase energy, and improves fitness. Increasing  exercise without diet control, however, may not burn enough calories to loose weight.     Start walking three days a week 10-20 minutes at a time  Work towards walking thirty minutes five days a week   Eventually, increase the speed of your walking for 1-2 minutes at time    In addition, we recommend that you review healthy lifestyles and methods for weight loss available through the National Institutes of Health patient information sites:  http://win.niddk.nih.gov/publications/index.htm    And look into health and wellness programs that may be available through your health insurance provider, employer, local community center, or jace club.

## 2025-05-05 NOTE — PROGRESS NOTES
Virtual Visit Details    Type of service:  Video Visit     Originating Location (pt. Location): Home    Distant Location (provider location):  On-site  Platform used for Video Visit: Joss    Sleep Apnea - Follow-up Visit:    Impression/Plan:    Mild obstructive sleep apnea with sleep related hypoxemia.  Tolerating his mandibular advancement device  well. Daytime symptoms are improved.   He will undergo a home sleep apnea test to re-evaluate hypoxemia.     Juan Carlos Helton will follow up once testing is completed.      Ely Martinez PA-C  Sleep Medicine       History of Present Illness:  Chief Complaint   Patient presents with    RECHECK    Sleep Apnea       Juan Carlos Helton presents for follow-up of their mild sleep apnea, managed with a mandibular advancement device .     He presented with loud snoring, witnessed apnea, bruxism, daytime fatigue/sleepiness (ESS 3) and difficulty maintaining sleep.  The STOP-BANG score is 5/8.    Home Sleep Apnea Testing - 5/3/22: 183 lbs 0 oz: AHI 14/hr. Supine AHI 17/hr.   Oxygen Umer of 78%.  Baseline 92%.  Sp02 =< 88% for 71 minutes  He slept on his back (64%), prone (0%), left (13%) and right (22%) sides.    Elected treatment with oral appliance therapy.    Do you use a CPAP Machine at home: (Patient-Rptd) No  Overall, on a scale of 0-10 how would you rate your CPAP (0 poor, 10 great):  good      Do you notice snoring  no, maybe on rare occasions.   Do you notice gasping arousals :  no  Is the device setting comfortable:  yes  If not, why:      What is your typical bedtime:  1030 pm  How long does it take you to go to sleep on therapy:  15 minutes  What time do you typically get out of bed for the day:  6-7 am  How many hours are you sleeping per night:  7.5-8   Do you feel well rested in the morning:  yes      EPWORTH SLEEPINESS SCALE         5/5/2025    10:01 AM    [place] Sleepiness Scale Vern Borden  [id number]<br>ESS - [place]/English - Final version - 21 Nov 07 - [facility]  [place].)   Sitting and reading Would never doze   Watching TV Would never doze   Sitting, inactive in a public place (e.g. a theatre or a meeting) Would never doze   As a passenger in a car for an hour without a break Would never doze   Lying down to rest in the afternoon when circumstances permit Slight chance of dozing   Sitting and talking to someone Would never doze   Sitting quietly after a lunch without alcohol Would never doze   In a car, while stopped for a few minutes in traffic Would never doze   [place] Score (MC) 1   [place] Score (Sleep) 1        Patient-reported       INSOMNIA SEVERITY INDEX (OSMAR)          5/5/2025    10:01 AM   Insomnia Severity Index (OSMAR)   Difficulty falling asleep 1   Difficulty staying asleep 2   Problems waking up too early 2   How SATISFIED/DISSATISFIED are you with your CURRENT sleep pattern? 2   How NOTICEABLE to others do you think your sleep problem is in terms of impairing the quality of your life? 1   How WORRIED/DISTRESSED are you about your current sleep problem? 1   To what extent do you consider your sleep problem to INTERFERE with your daily functioning (e.g. daytime fatigue, mood, ability to function at work/daily chores, concentration, memory, mood, etc.) CURRENTLY? 1   OSMAR Total Score 10        Patient-reported       Guidelines for Scoring/Interpretation:  Total score categories:  0-7 = No clinically significant insomnia   8-14 = Subthreshold insomnia   15-21 = Clinical insomnia (moderate severity)  22-28 = Clinical insomnia (severe)  Used via courtesy of www.myhealth.va.gov with permission from Chucky Mcfarland PhD., [facility]        Past medical/surgical history, family history, social history, medications and allergies were reviewed.        Problem List:  Patient Active Problem List    Diagnosis Date Noted    Personal history of colon polyps, unspecified 01/27/2024     Priority: Medium    Hiatal hernia 09/07/2022     Priority: Medium    OANH  (obstructive sleep apnea) 07/25/2022     Priority: Medium     Home Sleep Apnea Testing - 5/3/22: 183 lbs 0 oz: AHI 14/hr. Supine AHI 17/hr.   Oxygen Umer of 78%.  Baseline 92%.  Sp02 =< 88% for 71 minutes  He slept on his back (64%), prone (0%), left (13%) and right (22%) sides.       Gastroesophageal reflux disease with esophagitis without hemorrhage 06/17/2019     Priority: Medium    Arthritis      Priority: Medium    Polyp of colon 03/20/2018     Priority: Medium    Osteoarthritis of subtalar joint 05/18/2011     Priority: Medium        Ht 1.829 m (6')   Wt 88.5 kg (195 lb)   BMI 26.45 kg/m

## 2025-08-17 ENCOUNTER — OFFICE VISIT (OUTPATIENT)
Dept: URGENT CARE | Facility: URGENT CARE | Age: 70
End: 2025-08-17
Payer: COMMERCIAL

## 2025-08-17 VITALS
OXYGEN SATURATION: 97 % | HEART RATE: 61 BPM | BODY MASS INDEX: 28.04 KG/M2 | SYSTOLIC BLOOD PRESSURE: 120 MMHG | TEMPERATURE: 98.5 F | HEIGHT: 72 IN | WEIGHT: 207 LBS | RESPIRATION RATE: 16 BRPM | DIASTOLIC BLOOD PRESSURE: 86 MMHG

## 2025-08-17 DIAGNOSIS — M54.50 ACUTE RIGHT-SIDED LOW BACK PAIN WITHOUT SCIATICA: Primary | ICD-10-CM

## 2025-08-17 PROCEDURE — 3079F DIAST BP 80-89 MM HG: CPT | Performed by: FAMILY MEDICINE

## 2025-08-17 PROCEDURE — 3074F SYST BP LT 130 MM HG: CPT | Performed by: FAMILY MEDICINE

## 2025-08-17 PROCEDURE — 99213 OFFICE O/P EST LOW 20 MIN: CPT | Performed by: FAMILY MEDICINE

## 2025-08-17 RX ORDER — METHOCARBAMOL 500 MG/1
500 TABLET, FILM COATED ORAL 4 TIMES DAILY PRN
Qty: 30 TABLET | Refills: 0 | Status: SHIPPED | OUTPATIENT
Start: 2025-08-17

## 2025-08-20 ENCOUNTER — THERAPY VISIT (OUTPATIENT)
Dept: PHYSICAL THERAPY | Facility: CLINIC | Age: 70
End: 2025-08-20
Attending: FAMILY MEDICINE
Payer: COMMERCIAL

## 2025-08-20 DIAGNOSIS — M54.50 ACUTE RIGHT-SIDED LOW BACK PAIN WITHOUT SCIATICA: ICD-10-CM

## 2025-08-20 PROCEDURE — 97110 THERAPEUTIC EXERCISES: CPT | Mod: GP | Performed by: PHYSICAL THERAPIST

## 2025-08-20 PROCEDURE — 97161 PT EVAL LOW COMPLEX 20 MIN: CPT | Mod: GP | Performed by: PHYSICAL THERAPIST
